# Patient Record
Sex: FEMALE
[De-identification: names, ages, dates, MRNs, and addresses within clinical notes are randomized per-mention and may not be internally consistent; named-entity substitution may affect disease eponyms.]

---

## 2019-07-31 ENCOUNTER — APPOINTMENT (OUTPATIENT)
Dept: MRI IMAGING | Facility: IMAGING CENTER | Age: 54
End: 2019-07-31
Payer: COMMERCIAL

## 2019-07-31 ENCOUNTER — OUTPATIENT (OUTPATIENT)
Dept: OUTPATIENT SERVICES | Facility: HOSPITAL | Age: 54
LOS: 1 days | End: 2019-07-31
Payer: COMMERCIAL

## 2019-07-31 DIAGNOSIS — Z00.8 ENCOUNTER FOR OTHER GENERAL EXAMINATION: ICD-10-CM

## 2019-07-31 PROCEDURE — C8937: CPT

## 2019-07-31 PROCEDURE — C8908: CPT

## 2019-07-31 PROCEDURE — 77049 MRI BREAST C-+ W/CAD BI: CPT | Mod: 26

## 2019-07-31 PROCEDURE — A9585: CPT

## 2019-08-13 ENCOUNTER — OUTPATIENT (OUTPATIENT)
Dept: OUTPATIENT SERVICES | Facility: HOSPITAL | Age: 54
LOS: 1 days | End: 2019-08-13
Payer: COMMERCIAL

## 2019-08-13 ENCOUNTER — APPOINTMENT (OUTPATIENT)
Dept: CT IMAGING | Facility: CLINIC | Age: 54
End: 2019-08-13
Payer: COMMERCIAL

## 2019-08-13 ENCOUNTER — APPOINTMENT (OUTPATIENT)
Dept: MRI IMAGING | Facility: CLINIC | Age: 54
End: 2019-08-13
Payer: COMMERCIAL

## 2019-08-13 ENCOUNTER — OUTPATIENT (OUTPATIENT)
Dept: OUTPATIENT SERVICES | Facility: HOSPITAL | Age: 54
LOS: 1 days | End: 2019-08-13

## 2019-08-13 DIAGNOSIS — Z00.8 ENCOUNTER FOR OTHER GENERAL EXAMINATION: ICD-10-CM

## 2019-08-13 PROCEDURE — 74183 MRI ABD W/O CNTR FLWD CNTR: CPT | Mod: 26

## 2019-08-13 PROCEDURE — A9585: CPT

## 2019-08-13 PROCEDURE — 74174 CTA ABD&PLVS W/CONTRAST: CPT

## 2019-08-13 PROCEDURE — 74174 CTA ABD&PLVS W/CONTRAST: CPT | Mod: 26

## 2019-08-13 PROCEDURE — 74183 MRI ABD W/O CNTR FLWD CNTR: CPT

## 2019-08-14 ENCOUNTER — OUTPATIENT (OUTPATIENT)
Dept: OUTPATIENT SERVICES | Facility: HOSPITAL | Age: 54
LOS: 1 days | End: 2019-08-14
Payer: COMMERCIAL

## 2019-08-14 VITALS
OXYGEN SATURATION: 98 % | WEIGHT: 166.89 LBS | DIASTOLIC BLOOD PRESSURE: 85 MMHG | TEMPERATURE: 98 F | HEIGHT: 64 IN | HEART RATE: 74 BPM | RESPIRATION RATE: 14 BRPM | SYSTOLIC BLOOD PRESSURE: 140 MMHG

## 2019-08-14 DIAGNOSIS — Z85.3 PERSONAL HISTORY OF MALIGNANT NEOPLASM OF BREAST: ICD-10-CM

## 2019-08-14 DIAGNOSIS — Z40.01 ENCOUNTER FOR PROPHYLACTIC REMOVAL OF BREAST: ICD-10-CM

## 2019-08-14 DIAGNOSIS — D05.00 LOBULAR CARCINOMA IN SITU OF UNSPECIFIED BREAST: ICD-10-CM

## 2019-08-14 DIAGNOSIS — Z90.13 ACQUIRED ABSENCE OF BILATERAL BREASTS AND NIPPLES: ICD-10-CM

## 2019-08-14 DIAGNOSIS — D05.12 INTRADUCTAL CARCINOMA IN SITU OF LEFT BREAST: ICD-10-CM

## 2019-08-14 DIAGNOSIS — Z29.9 ENCOUNTER FOR PROPHYLACTIC MEASURES, UNSPECIFIED: ICD-10-CM

## 2019-08-14 DIAGNOSIS — Z98.891 HISTORY OF UTERINE SCAR FROM PREVIOUS SURGERY: Chronic | ICD-10-CM

## 2019-08-14 DIAGNOSIS — N63.0 UNSPECIFIED LUMP IN UNSPECIFIED BREAST: ICD-10-CM

## 2019-08-14 DIAGNOSIS — R20.1 HYPOESTHESIA OF SKIN: ICD-10-CM

## 2019-08-14 LAB
BLD GP AB SCN SERPL QL: NEGATIVE — SIGNIFICANT CHANGE UP
RH IG SCN BLD-IMP: NEGATIVE — SIGNIFICANT CHANGE UP

## 2019-08-14 PROCEDURE — 86901 BLOOD TYPING SEROLOGIC RH(D): CPT

## 2019-08-14 PROCEDURE — 86900 BLOOD TYPING SEROLOGIC ABO: CPT

## 2019-08-14 PROCEDURE — G0463: CPT

## 2019-08-14 PROCEDURE — 86850 RBC ANTIBODY SCREEN: CPT

## 2019-08-14 RX ORDER — CEFAZOLIN SODIUM 1 G
2000 VIAL (EA) INJECTION ONCE
Refills: 0 | Status: DISCONTINUED | OUTPATIENT
Start: 2019-08-19 | End: 2019-08-21

## 2019-08-14 NOTE — H&P PST ADULT - ASSESSMENT
breat mass bilateral  CAPRINI VTE 2.0 SCORE [CLOT updated 2019]    AGE RELATED RISK FACTORS                                                       MOBILITY RELATED FACTORS  [x ] Age 41-60 years                                            (1 Point)                    [ ] Bed rest                                                        (1 Point)  [ ] Age: 61-74 years                                           (2 Points)                  [ ] Plaster cast                                                   (2 Points)  [ ] Age= 75 years                                              (3 Points)                    [ ] Bed bound for more than 72 hours                 (2 Points)    DISEASE RELATED RISK FACTORS                                               GENDER SPECIFIC FACTORS  [ ] Edema in the lower extremities                       (1 Point)              [ ] Pregnancy                                                     (1 Point)  [ ] Varicose veins                                               (1 Point)                     [ ] Post-partum < 6 weeks                                   (1 Point)             [x ] BMI > 25 Kg/m2                                            (1 Point)                     [ ] Hormonal therapy  or oral contraception          (1 Point)                 [ ] Sepsis (in the previous month)                        (1 Point)               [ ] History of pregnancy complications                 (1 point)  [ ] Pneumonia or serious lung disease                                               [ ] Unexplained or recurrent                     (1 Point)           (in the previous month)                               (1 Point)  [ ] Abnormal pulmonary function test                     (1 Point)                 SURGERY RELATED RISK FACTORS  [ ] Acute myocardial infarction                              (1 Point)               [ ]  Section                                             (1 Point)  [ ] Congestive heart failure (in the previous month)  (1 Point)      [ ] Minor surgery                                                  (1 Point)   [ ] Inflammatory bowel disease                             (1 Point)               [ ] Arthroscopic surgery                                        (2 Points)  [ ] Central venous access                                      (2 Points)                [ x] General surgery lasting more than 45 minutes (2 points)  [x ] Malignancy- Present or previous                   (2 Points)                [ ] Elective arthroplasty                                         (5 points)    [ ] Stroke (in the previous month)                          (5 Points)                                                                                                                                                           HEMATOLOGY RELATED FACTORS                                                 TRAUMA RELATED RISK FACTORS  [ ] Prior episodes of VTE                                     (3 Points)                [ ] Fracture of the hip, pelvis, or leg                       (5 Points)  [ ] Positive family history for VTE                         (3 Points)             [ ] Acute spinal cord injury (in the previous month)  (5 Points)  [ ] Prothrombin 12495 A                                     (3 Points)               [ ] Paralysis  (less than 1 month)                             (5 Points)  [ ] Factor V Leiden                                             (3 Points)                  [ ] Multiple Trauma within 1 month                        (5 Points)  [ ] Lupus anticoagulants                                     (3 Points)                                                           [ ] Anticardiolipin antibodies                               (3 Points)                                                       [ ] High homocysteine in the blood                      (3 Points)                                             [ ] Other congenital or acquired thrombophilia      (3 Points)                                                [ ] Heparin induced thrombocytopenia                  (3 Points)                                     Total Score [6 ]

## 2019-08-14 NOTE — H&P PST ADULT - HISTORY OF PRESENT ILLNESS
54 year old female reports having abnormal mammogram with breast lump in both breast, scheduled for bilateral simple mastectomies with reconstruction on 08/19/19.

## 2019-08-14 NOTE — H&P PST ADULT - NSICDXPROBLEM_GEN_ALL_CORE_FT
PROBLEM DIAGNOSES  Problem: Breast lump  Assessment and Plan: Bilateral mastectomies PC  bilateral sentinel LN biopsies  Possible axillary dissection  Bilateral ESA  Bilateral nerve grafting    Problem: Prophylactic measure  Assessment and Plan: The Caprini score indicates that this patient is at high risk for a VTE event (score 6 or greater). Surgical patients in this group will benefit from both pharmacologic prophylaxis and intermittent compression devices.  The surgical team will determine the balance between VTE risk and bleeding risk, and other clinical considerations

## 2019-08-15 ENCOUNTER — OUTPATIENT (OUTPATIENT)
Dept: OUTPATIENT SERVICES | Facility: HOSPITAL | Age: 54
LOS: 1 days | End: 2019-08-15
Payer: COMMERCIAL

## 2019-08-15 DIAGNOSIS — C50.919 MALIGNANT NEOPLASM OF UNSPECIFIED SITE OF UNSPECIFIED FEMALE BREAST: ICD-10-CM

## 2019-08-15 DIAGNOSIS — Z98.891 HISTORY OF UTERINE SCAR FROM PREVIOUS SURGERY: Chronic | ICD-10-CM

## 2019-08-16 PROBLEM — N63.0 UNSPECIFIED LUMP IN UNSPECIFIED BREAST: Chronic | Status: ACTIVE | Noted: 2019-08-14

## 2019-08-18 ENCOUNTER — TRANSCRIPTION ENCOUNTER (OUTPATIENT)
Age: 54
End: 2019-08-18

## 2019-08-19 ENCOUNTER — RESULT REVIEW (OUTPATIENT)
Age: 54
End: 2019-08-19

## 2019-08-19 ENCOUNTER — INPATIENT (INPATIENT)
Facility: HOSPITAL | Age: 54
LOS: 1 days | Discharge: HOME | DRG: 581 | End: 2019-08-21
Attending: PLASTIC SURGERY | Admitting: SPECIALIST
Payer: COMMERCIAL

## 2019-08-19 ENCOUNTER — APPOINTMENT (OUTPATIENT)
Dept: NUCLEAR MEDICINE | Facility: HOSPITAL | Age: 54
End: 2019-08-19

## 2019-08-19 VITALS
TEMPERATURE: 98 F | HEART RATE: 75 BPM | OXYGEN SATURATION: 99 % | SYSTOLIC BLOOD PRESSURE: 129 MMHG | RESPIRATION RATE: 18 BRPM | HEIGHT: 64 IN | WEIGHT: 166.89 LBS | DIASTOLIC BLOOD PRESSURE: 84 MMHG

## 2019-08-19 DIAGNOSIS — Z98.891 HISTORY OF UTERINE SCAR FROM PREVIOUS SURGERY: Chronic | ICD-10-CM

## 2019-08-19 DIAGNOSIS — D05.00 LOBULAR CARCINOMA IN SITU OF UNSPECIFIED BREAST: ICD-10-CM

## 2019-08-19 DIAGNOSIS — R20.1 HYPOESTHESIA OF SKIN: ICD-10-CM

## 2019-08-19 LAB
ANION GAP SERPL CALC-SCNC: 16 MMOL/L — SIGNIFICANT CHANGE UP (ref 5–17)
BUN SERPL-MCNC: 11 MG/DL — SIGNIFICANT CHANGE UP (ref 7–23)
CALCIUM SERPL-MCNC: 7.2 MG/DL — LOW (ref 8.4–10.5)
CHLORIDE SERPL-SCNC: 108 MMOL/L — SIGNIFICANT CHANGE UP (ref 96–108)
CO2 SERPL-SCNC: 17 MMOL/L — LOW (ref 22–31)
CREAT SERPL-MCNC: 0.74 MG/DL — SIGNIFICANT CHANGE UP (ref 0.5–1.3)
GLUCOSE SERPL-MCNC: 180 MG/DL — HIGH (ref 70–99)
HCT VFR BLD CALC: 33.3 % — LOW (ref 34.5–45)
HGB BLD-MCNC: 10.9 G/DL — LOW (ref 11.5–15.5)
MCHC RBC-ENTMCNC: 28.8 PG — SIGNIFICANT CHANGE UP (ref 27–34)
MCHC RBC-ENTMCNC: 32.7 GM/DL — SIGNIFICANT CHANGE UP (ref 32–36)
MCV RBC AUTO: 88 FL — SIGNIFICANT CHANGE UP (ref 80–100)
PLATELET # BLD AUTO: 317 K/UL — SIGNIFICANT CHANGE UP (ref 150–400)
POTASSIUM SERPL-MCNC: 4.2 MMOL/L — SIGNIFICANT CHANGE UP (ref 3.5–5.3)
POTASSIUM SERPL-SCNC: 4.2 MMOL/L — SIGNIFICANT CHANGE UP (ref 3.5–5.3)
RBC # BLD: 3.79 M/UL — LOW (ref 3.8–5.2)
RBC # FLD: 11.7 % — SIGNIFICANT CHANGE UP (ref 10.3–14.5)
RH IG SCN BLD-IMP: NEGATIVE — SIGNIFICANT CHANGE UP
SODIUM SERPL-SCNC: 141 MMOL/L — SIGNIFICANT CHANGE UP (ref 135–145)
WBC # BLD: 20.6 K/UL — HIGH (ref 3.8–10.5)
WBC # FLD AUTO: 20.6 K/UL — HIGH (ref 3.8–10.5)

## 2019-08-19 PROCEDURE — 88321 CONSLTJ&REPRT SLD PREP ELSWR: CPT

## 2019-08-19 PROCEDURE — 88360 TUMOR IMMUNOHISTOCHEM/MANUAL: CPT | Mod: 26,59

## 2019-08-19 PROCEDURE — 88342 IMHCHEM/IMCYTCHM 1ST ANTB: CPT | Mod: 26,59

## 2019-08-19 PROCEDURE — 88307 TISSUE EXAM BY PATHOLOGIST: CPT | Mod: 26,59

## 2019-08-19 PROCEDURE — 88341 IMHCHEM/IMCYTCHM EA ADD ANTB: CPT | Mod: 26,59

## 2019-08-19 PROCEDURE — 88302 TISSUE EXAM BY PATHOLOGIST: CPT | Mod: 26,59

## 2019-08-19 PROCEDURE — 88331 PATH CONSLTJ SURG 1 BLK 1SPC: CPT | Mod: 26

## 2019-08-19 PROCEDURE — 88305 TISSUE EXAM BY PATHOLOGIST: CPT | Mod: 26,59

## 2019-08-19 PROCEDURE — 88377 M/PHMTRC ALYS ISHQUANT/SEMIQ: CPT | Mod: 26,59

## 2019-08-19 RX ORDER — KETOROLAC TROMETHAMINE 30 MG/ML
15 SYRINGE (ML) INJECTION EVERY 6 HOURS
Refills: 0 | Status: DISCONTINUED | OUTPATIENT
Start: 2019-08-19 | End: 2019-08-20

## 2019-08-19 RX ORDER — CALCIUM CARBONATE 500(1250)
1 TABLET ORAL THREE TIMES A DAY
Refills: 0 | Status: DISCONTINUED | OUTPATIENT
Start: 2019-08-19 | End: 2019-08-21

## 2019-08-19 RX ORDER — OXYCODONE HYDROCHLORIDE 5 MG/1
5 TABLET ORAL EVERY 4 HOURS
Refills: 0 | Status: DISCONTINUED | OUTPATIENT
Start: 2019-08-19 | End: 2019-08-21

## 2019-08-19 RX ORDER — CEFAZOLIN SODIUM 1 G
2000 VIAL (EA) INJECTION EVERY 8 HOURS
Refills: 0 | Status: COMPLETED | OUTPATIENT
Start: 2019-08-19 | End: 2019-08-20

## 2019-08-19 RX ORDER — BENZOCAINE AND MENTHOL 5; 1 G/100ML; G/100ML
1 LIQUID ORAL THREE TIMES A DAY
Refills: 0 | Status: DISCONTINUED | OUTPATIENT
Start: 2019-08-19 | End: 2019-08-21

## 2019-08-19 RX ORDER — FENTANYL CITRATE 50 UG/ML
25 INJECTION INTRAVENOUS
Refills: 0 | Status: DISCONTINUED | OUTPATIENT
Start: 2019-08-19 | End: 2019-08-20

## 2019-08-19 RX ORDER — OXYCODONE HYDROCHLORIDE 5 MG/1
10 TABLET ORAL EVERY 4 HOURS
Refills: 0 | Status: DISCONTINUED | OUTPATIENT
Start: 2019-08-19 | End: 2019-08-21

## 2019-08-19 RX ORDER — ONDANSETRON 8 MG/1
4 TABLET, FILM COATED ORAL ONCE
Refills: 0 | Status: COMPLETED | OUTPATIENT
Start: 2019-08-19 | End: 2019-08-19

## 2019-08-19 RX ORDER — HYDROMORPHONE HYDROCHLORIDE 2 MG/ML
0.5 INJECTION INTRAMUSCULAR; INTRAVENOUS; SUBCUTANEOUS EVERY 4 HOURS
Refills: 0 | Status: DISCONTINUED | OUTPATIENT
Start: 2019-08-19 | End: 2019-08-21

## 2019-08-19 RX ORDER — ACETAMINOPHEN 500 MG
650 TABLET ORAL EVERY 6 HOURS
Refills: 0 | Status: DISCONTINUED | OUTPATIENT
Start: 2019-08-20 | End: 2019-08-21

## 2019-08-19 RX ORDER — SODIUM CHLORIDE 9 MG/ML
3 INJECTION INTRAMUSCULAR; INTRAVENOUS; SUBCUTANEOUS EVERY 8 HOURS
Refills: 0 | Status: DISCONTINUED | OUTPATIENT
Start: 2019-08-19 | End: 2019-08-19

## 2019-08-19 RX ORDER — CHLORHEXIDINE GLUCONATE 213 G/1000ML
1 SOLUTION TOPICAL ONCE
Refills: 0 | Status: DISCONTINUED | OUTPATIENT
Start: 2019-08-19 | End: 2019-08-19

## 2019-08-19 RX ORDER — LIDOCAINE HCL 20 MG/ML
0.2 VIAL (ML) INJECTION ONCE
Refills: 0 | Status: DISCONTINUED | OUTPATIENT
Start: 2019-08-19 | End: 2019-08-19

## 2019-08-19 RX ORDER — DIPHENHYDRAMINE HCL 50 MG
25 CAPSULE ORAL EVERY 4 HOURS
Refills: 0 | Status: DISCONTINUED | OUTPATIENT
Start: 2019-08-19 | End: 2019-08-21

## 2019-08-19 RX ORDER — FENTANYL CITRATE 50 UG/ML
50 INJECTION INTRAVENOUS
Refills: 0 | Status: DISCONTINUED | OUTPATIENT
Start: 2019-08-19 | End: 2019-08-19

## 2019-08-19 RX ORDER — ACETAMINOPHEN 500 MG
1000 TABLET ORAL ONCE
Refills: 0 | Status: COMPLETED | OUTPATIENT
Start: 2019-08-19 | End: 2019-08-19

## 2019-08-19 RX ORDER — ACETAMINOPHEN 500 MG
1000 TABLET ORAL ONCE
Refills: 0 | Status: COMPLETED | OUTPATIENT
Start: 2019-08-20 | End: 2019-08-20

## 2019-08-19 RX ORDER — HEPARIN SODIUM 5000 [USP'U]/ML
5000 INJECTION INTRAVENOUS; SUBCUTANEOUS EVERY 12 HOURS
Refills: 0 | Status: DISCONTINUED | OUTPATIENT
Start: 2019-08-19 | End: 2019-08-21

## 2019-08-19 RX ORDER — METOCLOPRAMIDE HCL 10 MG
10 TABLET ORAL ONCE
Refills: 0 | Status: COMPLETED | OUTPATIENT
Start: 2019-08-19 | End: 2019-08-19

## 2019-08-19 RX ORDER — IBUPROFEN 200 MG
400 TABLET ORAL EVERY 8 HOURS
Refills: 0 | Status: DISCONTINUED | OUTPATIENT
Start: 2019-08-20 | End: 2019-08-21

## 2019-08-19 RX ORDER — SODIUM CHLORIDE 9 MG/ML
1000 INJECTION, SOLUTION INTRAVENOUS
Refills: 0 | Status: DISCONTINUED | OUTPATIENT
Start: 2019-08-19 | End: 2019-08-20

## 2019-08-19 RX ADMIN — Medication 100 MILLIGRAM(S): at 20:00

## 2019-08-19 RX ADMIN — Medication 400 MILLIGRAM(S): at 23:00

## 2019-08-19 RX ADMIN — Medication 15 MILLIGRAM(S): at 22:02

## 2019-08-19 RX ADMIN — FENTANYL CITRATE 50 MICROGRAM(S): 50 INJECTION INTRAVENOUS at 19:45

## 2019-08-19 RX ADMIN — FENTANYL CITRATE 50 MICROGRAM(S): 50 INJECTION INTRAVENOUS at 19:33

## 2019-08-19 RX ADMIN — Medication 15 MILLIGRAM(S): at 22:15

## 2019-08-19 RX ADMIN — SODIUM CHLORIDE 125 MILLILITER(S): 9 INJECTION, SOLUTION INTRAVENOUS at 19:05

## 2019-08-19 RX ADMIN — Medication 1000 MILLIGRAM(S): at 23:30

## 2019-08-19 RX ADMIN — ONDANSETRON 4 MILLIGRAM(S): 8 TABLET, FILM COATED ORAL at 19:33

## 2019-08-19 RX ADMIN — Medication 10 MILLIGRAM(S): at 20:46

## 2019-08-19 NOTE — PRE-ANESTHESIA EVALUATION ADULT - NSANTHAIRWAYFT_ENT_ALL_CORE
Mouth opening: >2cm incisor distance  Thyromental distance: >3 FBs  Upper lip bite: adequate  Cervical ROM: grossly intact

## 2019-08-19 NOTE — BRIEF OPERATIVE NOTE - OPERATION/FINDINGS
Bilateral simple mastectomy with axillary sentinel node biopsy
b/l breast reconstruction with b/l ESA flaps (ipsilateral)

## 2019-08-19 NOTE — BRIEF OPERATIVE NOTE - NSICDXBRIEFPOSTOP_GEN_ALL_CORE_FT
POST-OP DIAGNOSIS:  Breast cancer 19-Aug-2019 12:14:28  Gustavo Arias
POST-OP DIAGNOSIS:  Breast cancer 19-Aug-2019 12:14:28  Gustavo Arias

## 2019-08-19 NOTE — BRIEF OPERATIVE NOTE - NSICDXBRIEFPROCEDURE_GEN_ALL_CORE_FT
PROCEDURES:  Bilateral simple mastectomy with sentinel node biopsy 19-Aug-2019 12:14:07  Gustavo Arias
PROCEDURES:  ESA flap, free 20-Aug-2019 06:00:47  Lazaro Lindsay

## 2019-08-19 NOTE — BRIEF OPERATIVE NOTE - NSICDXBRIEFPREOP_GEN_ALL_CORE_FT
PRE-OP DIAGNOSIS:  Breast cancer 19-Aug-2019 12:14:19  Gustavo Arias
PRE-OP DIAGNOSIS:  Breast cancer 19-Aug-2019 12:14:19  Gustavo Arias

## 2019-08-19 NOTE — PRE-ANESTHESIA EVALUATION ADULT - NSANTHPMHFT_GEN_ALL_CORE
denies further PMHx denies further PMHx  denies cardiac, respiratory, neurological, renal, hepatic, hematalogic disease

## 2019-08-20 LAB
ANION GAP SERPL CALC-SCNC: 9 MMOL/L — SIGNIFICANT CHANGE UP (ref 5–17)
BUN SERPL-MCNC: 9 MG/DL — SIGNIFICANT CHANGE UP (ref 7–23)
CALCIUM SERPL-MCNC: 7.3 MG/DL — LOW (ref 8.4–10.5)
CHLORIDE SERPL-SCNC: 109 MMOL/L — HIGH (ref 96–108)
CO2 SERPL-SCNC: 23 MMOL/L — SIGNIFICANT CHANGE UP (ref 22–31)
CREAT SERPL-MCNC: 0.64 MG/DL — SIGNIFICANT CHANGE UP (ref 0.5–1.3)
GLUCOSE SERPL-MCNC: 118 MG/DL — HIGH (ref 70–99)
HCT VFR BLD CALC: 28 % — LOW (ref 34.5–45)
HGB BLD-MCNC: 9 G/DL — LOW (ref 11.5–15.5)
MCHC RBC-ENTMCNC: 28.3 PG — SIGNIFICANT CHANGE UP (ref 27–34)
MCHC RBC-ENTMCNC: 32.2 GM/DL — SIGNIFICANT CHANGE UP (ref 32–36)
MCV RBC AUTO: 88 FL — SIGNIFICANT CHANGE UP (ref 80–100)
PLATELET # BLD AUTO: 243 K/UL — SIGNIFICANT CHANGE UP (ref 150–400)
POTASSIUM SERPL-MCNC: 4.1 MMOL/L — SIGNIFICANT CHANGE UP (ref 3.5–5.3)
POTASSIUM SERPL-SCNC: 4.1 MMOL/L — SIGNIFICANT CHANGE UP (ref 3.5–5.3)
RBC # BLD: 3.18 M/UL — LOW (ref 3.8–5.2)
RBC # FLD: 11.9 % — SIGNIFICANT CHANGE UP (ref 10.3–14.5)
SODIUM SERPL-SCNC: 141 MMOL/L — SIGNIFICANT CHANGE UP (ref 135–145)
WBC # BLD: 12.7 K/UL — HIGH (ref 3.8–10.5)
WBC # FLD AUTO: 12.7 K/UL — HIGH (ref 3.8–10.5)

## 2019-08-20 RX ADMIN — OXYCODONE HYDROCHLORIDE 5 MILLIGRAM(S): 5 TABLET ORAL at 21:20

## 2019-08-20 RX ADMIN — OXYCODONE HYDROCHLORIDE 5 MILLIGRAM(S): 5 TABLET ORAL at 15:00

## 2019-08-20 RX ADMIN — Medication 400 MILLIGRAM(S): at 09:49

## 2019-08-20 RX ADMIN — Medication 650 MILLIGRAM(S): at 18:18

## 2019-08-20 RX ADMIN — Medication 1000 MILLIGRAM(S): at 05:30

## 2019-08-20 RX ADMIN — Medication 400 MILLIGRAM(S): at 18:18

## 2019-08-20 RX ADMIN — Medication 650 MILLIGRAM(S): at 23:34

## 2019-08-20 RX ADMIN — Medication 400 MILLIGRAM(S): at 10:19

## 2019-08-20 RX ADMIN — HEPARIN SODIUM 5000 UNIT(S): 5000 INJECTION INTRAVENOUS; SUBCUTANEOUS at 02:12

## 2019-08-20 RX ADMIN — HEPARIN SODIUM 5000 UNIT(S): 5000 INJECTION INTRAVENOUS; SUBCUTANEOUS at 15:00

## 2019-08-20 RX ADMIN — Medication 15 MILLIGRAM(S): at 04:05

## 2019-08-20 RX ADMIN — OXYCODONE HYDROCHLORIDE 5 MILLIGRAM(S): 5 TABLET ORAL at 20:50

## 2019-08-20 RX ADMIN — SODIUM CHLORIDE 75 MILLILITER(S): 9 INJECTION, SOLUTION INTRAVENOUS at 09:50

## 2019-08-20 RX ADMIN — Medication 15 MILLIGRAM(S): at 04:20

## 2019-08-20 RX ADMIN — OXYCODONE HYDROCHLORIDE 5 MILLIGRAM(S): 5 TABLET ORAL at 15:30

## 2019-08-20 RX ADMIN — Medication 100 MILLIGRAM(S): at 04:06

## 2019-08-20 RX ADMIN — Medication 650 MILLIGRAM(S): at 11:33

## 2019-08-20 RX ADMIN — Medication 400 MILLIGRAM(S): at 05:04

## 2019-08-20 NOTE — PROGRESS NOTE ADULT - ASSESSMENT
54yF POD1  bilateral mastectomy and ESA flap reconstruction. Recovering appropriately in PACU.     Plan:  - Pain control  - OOB  - Continue care per Plastics     Green team surgery,  [id number]

## 2019-08-20 NOTE — PROGRESS NOTE ADULT - SUBJECTIVE AND OBJECTIVE BOX
ANESTHESIA POSTOP CHECK    54y Female POSTOP DAY 1     Vital Signs Last 24 Hrs  T(C): 36.2 (20 Aug 2019 08:45), Max: 36.7 (20 Aug 2019 00:00)  T(F): 97.2 (20 Aug 2019 08:45), Max: 98.1 (20 Aug 2019 00:00)  HR: 87 (20 Aug 2019 08:45) (69 - 98)  BP: 98/54 (20 Aug 2019 08:45) (87/49 - 102/58)  BP(mean): 72 (20 Aug 2019 08:45) (62 - 76)  RR: 18 (20 Aug 2019 08:45) (16 - 18)  SpO2: 98% (20 Aug 2019 08:45) (96% - 100%)  I&O's Summary    19 Aug 2019 07:01  -  20 Aug 2019 07:00  --------------------------------------------------------  IN: 2025 mL / OUT: 1768 mL / NET: 257 mL    20 Aug 2019 07:01  -  20 Aug 2019 08:53  --------------------------------------------------------  IN: 275 mL / OUT: 0 mL / NET: 275 mL        [x] NO APPARENT ANESTHESIA COMPLICATIONS

## 2019-08-20 NOTE — PROGRESS NOTE ADULT - SUBJECTIVE AND OBJECTIVE BOX
SUBJECTIVE: Pt seen and examined at bedside with chief resident. POD1 s/p bilateral mastectomy with ESA flap. No events overnight. Patient reports that pain is controlled. Vioptix showing signals in 80-90s overnight. Capillary refill and doppler checks insignificant. Blood pressure mildly low overnight, consistent with post-anesthesia effects.     Damon: 100-150cc/hr   ROMAIN: 30cc, 25cc, 40cc, 18cc, 25cc, 40cc    MEDICATIONS  (STANDING):  acetaminophen   Tablet .. 650 milliGRAM(s) Oral every 6 hours  ceFAZolin   IVPB 2000 milliGRAM(s) IV Intermittent once  heparin  Injectable 5000 Unit(s) SubCutaneous every 12 hours  ibuprofen  Tablet. 400 milliGRAM(s) Oral every 8 hours  lactated ringers. 1000 milliLiter(s) (75 mL/Hr) IV Continuous <Continuous>    MEDICATIONS  (PRN):  benzocaine 15 mG/menthol 3.6 mG Lozenge 1 Lozenge Oral three times a day PRN Sore Throat  calcium carbonate    500 mG (Tums) Chewable 1 Tablet(s) Chew three times a day PRN Dyspepsia  diphenhydrAMINE 25 milliGRAM(s) Oral every 4 hours PRN Rash and/or Itching  fentaNYL    Injectable 25 MICROGram(s) IV Push every 5 minutes PRN Moderate Pain (4 - 6)  HYDROmorphone  Injectable 0.5 milliGRAM(s) IV Push every 4 hours PRN breakthrough pain  oxyCODONE    IR 5 milliGRAM(s) Oral every 4 hours PRN Moderate Pain (4 - 6)  oxyCODONE    IR 10 milliGRAM(s) Oral every 4 hours PRN Severe Pain (7 - 10)      OBJECTIVE:    Vital Signs Last 24 Hrs  T(C): 36.5 (20 Aug 2019 02:00), Max: 36.7 (20 Aug 2019 00:00)  T(F): 97.7 (20 Aug 2019 02:00), Max: 98.1 (20 Aug 2019 00:00)  HR: 75 (20 Aug 2019 04:00) (69 - 98)  BP: 90/53 (20 Aug 2019 04:00) (87/52 - 129/84)  BP(mean): 63 (20 Aug 2019 04:00) (63 - 76)  RR: 16 (20 Aug 2019 04:00) (16 - 18)  SpO2: 100% (20 Aug 2019 04:00) (96% - 100%)    General Appearance: Resting comfortably, no acute distress  Chest: non-labored breathing, no respiratory distress, skin flaps over breasts with good color and capillary refill, vioptix in place showing 88 and 99%, incisions clean dry and intact, drains bilaterally SS  CV: Pulse regular presently  Abdomen: Soft, non-tender, non-distended, incision clean dry and intact, drains SS  Extremities: warm and well perfused    I&O's Summary    19 Aug 2019 07:01  -  20 Aug 2019 06:38  --------------------------------------------------------  IN: 1900 mL / OUT: 1633 mL / NET: 267 mL      I&O's Detail    19 Aug 2019 07:01  -  20 Aug 2019 06:38  --------------------------------------------------------  IN:    IV PiggyBack: 300 mL    lactated ringers.: 1500 mL    Oral Fluid: 100 mL  Total IN: 1900 mL    OUT:    Bulb: 25 mL    Bulb: 18 mL    Bulb: 30 mL    Bulb: 25 mL    Bulb: 40 mL    Bulb: 40 mL    Indwelling Catheter - Urethral: 1455 mL  Total OUT: 1633 mL    Total NET: 267 mL            LABS:                        9.0    12.7  )-----------( 243      ( 20 Aug 2019 02:42 )             28.0     08-20    141  |  109<H>  |  9   ----------------------------<  118<H>  4.1   |  23  |  0.64    Ca    7.3<L>      20 Aug 2019 02:42            RADIOLOGY & ADDITIONAL STUDIES:

## 2019-08-20 NOTE — PROGRESS NOTE ADULT - ASSESSMENT
54 year old female POD1 s/p bilateral mastectomy and ESA flap reconstruction. Recovering well, no events overnight.     Plan:  - transfer to floor  - Q2 skin flap checks  - continue vioptix  - regular diet  - out of bed to chair with assistance  - d/c abdi catheter     Plastic Surgery  [phone]

## 2019-08-20 NOTE — PROGRESS NOTE ADULT - SUBJECTIVE AND OBJECTIVE BOX
NAD  Afebrile, VSS  ViOptix   Bilateral breast free flap viable, closures intact.  Abdomen flat, closure intact.  Drains serosang.  Hb - 9.9    Stable POD 1  T(C): 36.5 (08-20-19 @ 02:00), Max: 36.7 (08-20-19 @ 00:00)  HR: 75 (08-20-19 @ 04:00) (69 - 98)  BP: 90/53 (08-20-19 @ 04:00) (87/52 - 129/84)  RR: 16 (08-20-19 @ 04:00) (16 - 18)  SpO2: 100% (08-20-19 @ 04:00) (96% - 100%)  Wt(kg): --    08-19 @ 07:01  -  08-20 @ 06:21  --------------------------------------------------------  IN: 1675 mL / OUT: 1513 mL / NET: 162 mL      Transfer to floor  Advance diet  OOB to chair  Continue ViOptix

## 2019-08-20 NOTE — PROGRESS NOTE ADULT - SUBJECTIVE AND OBJECTIVE BOX
Interval Events: No acute interval events    S: Patient doing well, denies fevers, chills, nausea, emesis, chest pain, SOB.    O: Vital Signs  T(C): 36.7 (08-20 @ 12:18), Max: 37.3 (08-20 @ 11:20)  HR: 87 (08-20 @ 12:18) (69 - 98)  BP: 101/55 (08-20 @ 12:18) (87/49 - 104/65)  RR: 18 (08-20 @ 12:18) (16 - 18)  SpO2: 96% (08-20 @ 12:18) (96% - 100%)  08-19-19 @ 07:01  -  08-20-19 @ 07:00  --------------------------------------------------------  IN: 2025 mL / OUT: 1768 mL / NET: 257 mL    08-20-19 @ 07:01  -  08-20-19 @ 16:35  --------------------------------------------------------  IN: 1740 mL / OUT: 1480 mL / NET: 260 mL      General: alert and oriented, NAD  Resp: airway patent, respirations unlabored  CVS: regular rate and rhythm  Chest: skin flaps over breasts with good color and capillary refill, vioptix in place showing 88 and 99%, incisions clean dry and intact, ROMAIN drains intact bilaterally and draining serosanguinous fluid  Abdomen: soft, nontender, nondistended, ROMAIN drains functional and draining Serosanguinous   Extremities: no edema  Skin: warm, dry, appropriate color                          9.0    12.7  )-----------( 243      ( 20 Aug 2019 02:42 )             28.0   08-20    141  |  109<H>  |  9   ----------------------------<  118<H>  4.1   |  23  |  0.64    Ca    7.3<L>      20 Aug 2019 02:42

## 2019-08-21 ENCOUNTER — TRANSCRIPTION ENCOUNTER (OUTPATIENT)
Age: 54
End: 2019-08-21

## 2019-08-21 VITALS
OXYGEN SATURATION: 94 % | RESPIRATION RATE: 18 BRPM | HEART RATE: 93 BPM | TEMPERATURE: 98 F | SYSTOLIC BLOOD PRESSURE: 122 MMHG | WEIGHT: 171.52 LBS | DIASTOLIC BLOOD PRESSURE: 69 MMHG

## 2019-08-21 PROCEDURE — 80048 BASIC METABOLIC PNL TOTAL CA: CPT

## 2019-08-21 PROCEDURE — 88342 IMHCHEM/IMCYTCHM 1ST ANTB: CPT

## 2019-08-21 PROCEDURE — 86901 BLOOD TYPING SEROLOGIC RH(D): CPT

## 2019-08-21 PROCEDURE — 88360 TUMOR IMMUNOHISTOCHEM/MANUAL: CPT

## 2019-08-21 PROCEDURE — A9541: CPT

## 2019-08-21 PROCEDURE — C1889: CPT

## 2019-08-21 PROCEDURE — 88331 PATH CONSLTJ SURG 1 BLK 1SPC: CPT

## 2019-08-21 PROCEDURE — 88305 TISSUE EXAM BY PATHOLOGIST: CPT

## 2019-08-21 PROCEDURE — 86900 BLOOD TYPING SEROLOGIC ABO: CPT

## 2019-08-21 PROCEDURE — 85027 COMPLETE CBC AUTOMATED: CPT

## 2019-08-21 PROCEDURE — 88307 TISSUE EXAM BY PATHOLOGIST: CPT

## 2019-08-21 PROCEDURE — 88377 M/PHMTRC ALYS ISHQUANT/SEMIQ: CPT

## 2019-08-21 PROCEDURE — 88302 TISSUE EXAM BY PATHOLOGIST: CPT

## 2019-08-21 PROCEDURE — 88341 IMHCHEM/IMCYTCHM EA ADD ANTB: CPT

## 2019-08-21 RX ORDER — ONDANSETRON 8 MG/1
4 TABLET, FILM COATED ORAL ONCE
Refills: 0 | Status: COMPLETED | OUTPATIENT
Start: 2019-08-21 | End: 2019-08-21

## 2019-08-21 RX ORDER — IBUPROFEN 200 MG
1 TABLET ORAL
Qty: 0 | Refills: 0 | DISCHARGE
Start: 2019-08-21

## 2019-08-21 RX ORDER — ACETAMINOPHEN 500 MG
2 TABLET ORAL
Qty: 0 | Refills: 0 | DISCHARGE
Start: 2019-08-21

## 2019-08-21 RX ORDER — ONDANSETRON 8 MG/1
1 TABLET, FILM COATED ORAL
Qty: 4 | Refills: 0
Start: 2019-08-21

## 2019-08-21 RX ORDER — OXYCODONE HYDROCHLORIDE 5 MG/1
1 TABLET ORAL
Qty: 0 | Refills: 0 | DISCHARGE
Start: 2019-08-21

## 2019-08-21 RX ADMIN — HEPARIN SODIUM 5000 UNIT(S): 5000 INJECTION INTRAVENOUS; SUBCUTANEOUS at 13:33

## 2019-08-21 RX ADMIN — HEPARIN SODIUM 5000 UNIT(S): 5000 INJECTION INTRAVENOUS; SUBCUTANEOUS at 02:00

## 2019-08-21 RX ADMIN — Medication 650 MILLIGRAM(S): at 14:08

## 2019-08-21 RX ADMIN — Medication 400 MILLIGRAM(S): at 09:35

## 2019-08-21 RX ADMIN — Medication 400 MILLIGRAM(S): at 10:00

## 2019-08-21 RX ADMIN — Medication 400 MILLIGRAM(S): at 02:00

## 2019-08-21 RX ADMIN — OXYCODONE HYDROCHLORIDE 5 MILLIGRAM(S): 5 TABLET ORAL at 10:15

## 2019-08-21 RX ADMIN — ONDANSETRON 4 MILLIGRAM(S): 8 TABLET, FILM COATED ORAL at 11:24

## 2019-08-21 RX ADMIN — Medication 650 MILLIGRAM(S): at 00:04

## 2019-08-21 RX ADMIN — Medication 650 MILLIGRAM(S): at 13:32

## 2019-08-21 RX ADMIN — OXYCODONE HYDROCHLORIDE 5 MILLIGRAM(S): 5 TABLET ORAL at 09:35

## 2019-08-21 RX ADMIN — Medication 650 MILLIGRAM(S): at 04:19

## 2019-08-21 RX ADMIN — Medication 400 MILLIGRAM(S): at 02:30

## 2019-08-21 NOTE — DISCHARGE NOTE NURSING/CASE MANAGEMENT/SOCIAL WORK - NSDCDPATPORTLINK_GEN_ALL_CORE
You can access the [employer] Patient Portal, offered by [facility], by registering with the following website: [email or web address]

## 2019-08-21 NOTE — PROVIDER CONTACT NOTE (OTHER) - BACKGROUND
s/p bilateral breast reconstruction with ESA flap on 8/19, on vioptix monitoring for B/L breast tissues oxygenation.

## 2019-08-21 NOTE — PROGRESS NOTE ADULT - ASSESSMENT
54 year old female POD2 s/p bilateral mastectomy and ESA flap reconstruction. Recovering well, skin flaps with good signal, color and warmth.     Plan:  - continue out of bed and regular diet  - drain teaching  - d/c home today or tomorrow   - discuss with Dr Abdullahi      Plastic Surgery  [phone]

## 2019-08-21 NOTE — PROGRESS NOTE ADULT - SUBJECTIVE AND OBJECTIVE BOX
NAD  Afebrile, VSS  ViOptix on right lost contact, replaced and reading 55-56 consistently in two different locations, left 73  strong arterial and venous doppler on both flaps bilaterally  Bilateral breast free flap viable, closures intact.  Abdomen flat, closure intact.  Drains serosang.    ambulate  dc vioptix   likely dc later today

## 2019-08-21 NOTE — DISCHARGE NOTE PROVIDER - NSDCFUADDINST_GEN_ALL_CORE_FT
WOUND CARE: Dressings have a tape over them that will fall off on it's own. It is okay to shower, but do not soak in water.     DRAIN CARE: Empty drains once per day and record the amount and color in each one.    ACTIVITY: Do not lift arms above shoulders. Walk with body flexed, or bent over until it is comfortable to stand up straight.     DIET: No change in diet    PAIN: Take over the counter pain medications and take prescription pain meds for breakthrough pain. Take these medications as prescribed.     MEDS: You can resume any home medications that you were taking. Take antibiotics as prescribed until follow up with Dr. Abdullahi.

## 2019-08-21 NOTE — DISCHARGE NOTE PROVIDER - NSDCCPTREATMENT_GEN_ALL_CORE_FT
PRINCIPAL PROCEDURE  Procedure: Breast reconstruction with ESA free flap  Findings and Treatment:       SECONDARY PROCEDURE  Procedure: Total mastectomy  Findings and Treatment:

## 2019-08-21 NOTE — DISCHARGE NOTE PROVIDER - HOSPITAL COURSE
54 year old female with history of breast cancer presented for bilateral mastectomy and iliana flap reconstruction. She tolerated the surgery well with no complications. She was monitored in the PACU overnight post-operatively for checking viability of her flaps with Vioptix and regular checks by nursing and physician teams. She was able to ambulate, urinate and tolerate a regular diet by POD2 and her pain was controlled with oral pain medications. She was discharged home to follow up with Dr. Morse and Dr. Abdullahi. 54 year old female with history of breast cancer presented for bilateral mastectomy and iliana flap reconstruction. She tolerated the surgery well with no complications. She was monitored in the PACU overnight post-operatively for checking viability of her flaps with Vioptix and regular checks by nursing and physician teams. She was able to ambulate, urinate and tolerate a regular diet by POD2 and her pain was controlled with oral pain medications. She had some mild nausea controlled with nausea medications. She was able to tolerate her diet with no vomiting. She was discharged home to follow up with Dr. Morse and Dr. Abdullahi.

## 2019-08-21 NOTE — DISCHARGE NOTE PROVIDER - CARE PROVIDER_API CALL
Bill Abdullahi)  Plastic Surgery  [address], [address]  [place] [address]  Phone: [phone]  Fax: [phone]  Follow Up Time:     Sherrie Morse)  Surgery  [address], [address]  [place] [address]  Phone: [phone]  Fax: [phone]  Follow Up Time:

## 2019-08-21 NOTE — PROGRESS NOTE ADULT - SUBJECTIVE AND OBJECTIVE BOX
SUBJECTIVE: Pt seen and examined at bedside with chief resident. POD2 s/p bilateral mastectomy with ESA flap. Overnight, vioptix on left stopped working, but skin flap remained warm, not discolored and with doppler signals. Right side skin flap on vioptix with 76%. Patient has minimal pain and no complaints.       MEDICATIONS  (STANDING):  acetaminophen   Tablet .. 650 milliGRAM(s) Oral every 6 hours  ceFAZolin   IVPB 2000 milliGRAM(s) IV Intermittent once  heparin  Injectable 5000 Unit(s) SubCutaneous every 12 hours  ibuprofen  Tablet. 400 milliGRAM(s) Oral every 8 hours    MEDICATIONS  (PRN):  benzocaine 15 mG/menthol 3.6 mG Lozenge 1 Lozenge Oral three times a day PRN Sore Throat  calcium carbonate    500 mG (Tums) Chewable 1 Tablet(s) Chew three times a day PRN Dyspepsia  diphenhydrAMINE 25 milliGRAM(s) Oral every 4 hours PRN Rash and/or Itching  HYDROmorphone  Injectable 0.5 milliGRAM(s) IV Push every 4 hours PRN breakthrough pain  oxyCODONE    IR 5 milliGRAM(s) Oral every 4 hours PRN Moderate Pain (4 - 6)  oxyCODONE    IR 10 milliGRAM(s) Oral every 4 hours PRN Severe Pain (7 - 10)      OBJECTIVE:    Vital Signs Last 24 Hrs  T(C): 36.5 (21 Aug 2019 05:56), Max: 37.3 (20 Aug 2019 11:20)  T(F): 97.7 (21 Aug 2019 05:56), Max: 99.1 (20 Aug 2019 11:20)  HR: 92 (21 Aug 2019 05:56) (78 - 96)  BP: 100/68 (21 Aug 2019 05:56) (95/56 - 108/67)  BP(mean): 72 (20 Aug 2019 08:45) (72 - 72)  RR: 16 (21 Aug 2019 05:56) (16 - 18)  SpO2: 96% (21 Aug 2019 05:56) (96% - 100%)      General Appearance: Resting comfortably, no acute distress  Chest: non-labored breathing, no respiratory distress, skin flaps over breasts with good color and capillary refill, right breast had doppler signals, left with vioptix at 76%, incisions clean dry and intact, drains bilaterally SS  CV: Pulse regular presently  Abdomen: Soft, non-tender, non-distended, incision clean dry and intact, drains SS  Extremities: warm and well perfused        08-20 @ 07:01  -  08-21 @ 07:00  --------------------------------------------------------  IN: 2480 mL / OUT: 3550 mL / NET: -1070 mL            LABS:                         9.0    12.7  )-----------( 243      ( 20 Aug 2019 02:42 )             28.0     08-20    141  |  109<H>  |  9   ----------------------------<  118<H>  4.1   |  23  |  0.64    Ca    7.3<L>      20 Aug 2019 02:42

## 2019-08-21 NOTE — PROVIDER CONTACT NOTE (OTHER) - ASSESSMENT
Pt is A&Ox4, right breast flap pale, incision site w/ serousanguinous drainage, greater than 3 seconds cap refill and vioptics reading no longer present. Pt is A&Ox4, right breast flap pale, incision site w/ serousanguinous drainage, greater than 3 seconds cap refill and vioptics reading no longer present. Per primary RN Kirk pt had decrease in Vioptix signaling of the right breast earlier in the morning of which he informed MD Shen

## 2019-08-22 LAB — SURGICAL PATHOLOGY STUDY: SIGNIFICANT CHANGE UP

## 2019-09-09 ENCOUNTER — OUTPATIENT (OUTPATIENT)
Dept: OUTPATIENT SERVICES | Facility: HOSPITAL | Age: 54
LOS: 1 days | Discharge: HOME | End: 2019-09-09

## 2019-09-09 DIAGNOSIS — Z98.891 HISTORY OF UTERINE SCAR FROM PREVIOUS SURGERY: Chronic | ICD-10-CM

## 2019-09-09 DIAGNOSIS — C50.919 MALIGNANT NEOPLASM OF UNSPECIFIED SITE OF UNSPECIFIED FEMALE BREAST: ICD-10-CM

## 2019-09-11 ENCOUNTER — APPOINTMENT (OUTPATIENT)
Dept: HEMATOLOGY ONCOLOGY | Facility: CLINIC | Age: 54
End: 2019-09-11
Payer: COMMERCIAL

## 2019-09-11 VITALS
HEIGHT: 63.78 IN | TEMPERATURE: 98.1 F | DIASTOLIC BLOOD PRESSURE: 73 MMHG | RESPIRATION RATE: 16 BRPM | OXYGEN SATURATION: 100 % | WEIGHT: 161.38 LBS | BODY MASS INDEX: 27.89 KG/M2 | SYSTOLIC BLOOD PRESSURE: 122 MMHG | HEART RATE: 80 BPM

## 2019-09-11 DIAGNOSIS — Z87.19 PERSONAL HISTORY OF OTHER DISEASES OF THE DIGESTIVE SYSTEM: ICD-10-CM

## 2019-09-11 PROCEDURE — 99205 OFFICE O/P NEW HI 60 MIN: CPT

## 2019-09-15 PROBLEM — Z87.19 HISTORY OF FATTY INFILTRATION OF LIVER: Status: RESOLVED | Noted: 2019-09-15 | Resolved: 2019-09-15

## 2019-09-15 NOTE — CONSULT LETTER
[Dear  ___] : Dear  [unfilled], [Consult Letter:] : I had the pleasure of evaluating your patient, [unfilled]. [( Thank you for referring [unfilled] for consultation for _____ )] : Thank you for referring [unfilled] for consultation for [unfilled] [Please see my note below.] : Please see my note below. [Consult Closing:] : Thank you very much for allowing me to participate in the care of this patient.  If you have any questions, please do not hesitate to contact me. [Sincerely,] : Sincerely, [FreeTextEntry2] : Sherrie Morse MD\par [address] \[place] [address] [FreeTextEntry3] : Javed Bernal MD\par Attending\par [facility]\par  [DrCarlo  ___] : Dr. SHAW

## 2019-09-15 NOTE — ASSESSMENT
[FreeTextEntry1] : 53 y/o  F with Stage IB left breast cancer s/p bilateral mastectomy with sentinel lymph node biopsy. We reviewed her pathology and reviewed the significance of triple negative breast cancer. We reviewed the aggressive nature of this cancer. We explained the recommendations of breast cancer less than 0.5 cm of observation since there is not enough evidence to prove the benefits of treatment outweigh risks. She is willing to continue with follow up with Dr Morse since she understands there is small risk of systemic breast cancer recurrence. We reviewed signs and symptoms of breast cancer recurrence. We reviewed the WINS study for low fat diet and exercise to improve breast cancer survival and Vitamin D supplement. Questions answered to her satisfaction. She will continue with follow up with Dr Morse.

## 2019-09-15 NOTE — HISTORY OF PRESENT ILLNESS
[Disease: _____________________] : Disease: [unfilled] [T: ___] : T[unfilled] [N: ___] : N[unfilled] [AJCC Stage: ____] : AJCC Stage: [unfilled] [de-identified] : Age 54: left breast cancer \par Screening mammogram was negative and sonogram showed area of distortion and posterior acoustical shadowing in the 2:00 axis. She had left breast u/s guided biopsy which showed invasive ductal carcinoma SBR 5/9, ER negative, WI negative, Cnp1yit 3+ with Ki67 5% along with extensive DCIS. FISH was performed on the biopsy specimen which was negative. She had MRI of the breast on 7/31/19 which showed 2 cm linear area of focal nonmass clumped enhancement and L breast 3.1 x 1.5 cm focal nonmass enhancement and 0.6 cm circumscribed mass in the outer anterior left breast. On 8/19/19, she underwent bilateral mastectomy and sentinel lymph node biopsy. The pathology showed 0.4 cm ER negative, WI negative, [id number] 2+ but CISH negative along with DCIS 0.5 cm; sentinel lymph nodes were negative.  [de-identified] : invasive ductal carcinoma ER negative, LA negative, [id number] CISH negative  [de-identified] : G [de-identified] : She is healing well from reconstruction. She has no family history of breast cancer. She thinks she had genetic testing done and was negative. She is present to review adjuvant treatment options. She prefers not to be on any chemotherapy.

## 2019-09-15 NOTE — PHYSICAL EXAM
[Fully active, able to carry on all pre-disease performance without restriction] : Status 0 - Fully active, able to carry on all pre-disease performance without restriction [Normal] : grossly intact [de-identified] : bilateral mastectomy with ESA flap reconstruction [de-identified] : ESA flap scar healed

## 2019-09-27 NOTE — H&P PST ADULT - LAST ECHOCARDIOGRAM
Patient:   CORKY CHEEMA            MRN: [id number]            FIN: [id number]              Age:   71 years     Sex:  MALE     :  44   Associated Diagnoses:   None   Author:   HOLLY ALLEN     Subjective   Chief complaint.  Additional information Sitting up in chair, feeling well. No cp, sob.  To have CABG tomorrow.  Ambulatory throughout the room. .       Health Status   Allergies:    Allergic Reactions (All)  NKA   Current medications:    Medications (14) Active  Scheduled: (9)  Aspirin 325 mg DR tab  325 mg 1 tab, Oral, Daily  Atorvastatin 80 mg tab  80 mg 1 tab, Oral, Q Bedtime  insulin glargine  52 unit 0.52 mL, Subcutaneous, Q12H  Insulin human lispro 10 unit/0.1 mL inj  2-10 unit, Subcutaneous, TID [with meals]  Lisinopril 20 mg tab  20 mg 1 tab, Oral, Daily  Metoprolol succinate 50 mg XL tab  50 mg 1 tab, Oral, Daily  Multivitamin with minerals chew tab  1 tab, Chewed, Daily  NF  Medication  12.5 mg, Oral, Daily  Victoza subcutaneous injection 18 mg/3 mL  1.8 mg, Subcutaneous, Daily  Continuous: (0)  PRN: (5)  Acetaminophen 325 mg tab  650 mg 2 tab, Oral, Q4H  Dextrose (glucose) 40% 15 gm/37.5 gm oral gel UD  15 gm, Oral, As Directed PRN  Dextrose (glucose) 50% 25 gm/50 mL syringe  12.5 gm 25 mL, IV Push, As Directed PRN  Glucagon 1 mg/1 mL emergency kit SDV  1 mg 1 mL, IM, As Directed PRN  Sodium chloride PF 0.9% flush inj 10 mL  2 mL, Flush, As Directed PRN      Review of Systems   Cardiovascular:  No chest pain.    Respiratory:  No shortness of breath.    All other systems ROS reviewed as documented in chart.     Objective   VS/Measurements   Vital Signs   19 11:04 CDT Heart/Pulse Rate 85 beats/min  Normal    SpO2 98 %  Normal   19 11:04 CDT Respiration Rate 18 breaths/min  HI   19 11:03 CDT Temperature - VS 36.6 deg_C  Normal    Temperature Source - VS Oral   19 11:03 CDT NIBP Systolic 142 mm Hg  HI    NIBP Diastolic 81 mm Hg  Normal    NIBP Source Left Arm     NIBP MAP Manual Entry 102     ,    Vitals between:   02-JUN-2019 11:13:29   TO   03-JUN-2019 11:13:29                   LAST RESULT MINIMUM MAXIMUM  Temperature 36.6 36.5 36.7  Heart Rate 85 85 94  Respiratory Rate 18 16 18  NISBP           142 116 143  NIDBP           81 68 85  SpO2                    98 95 98  , Measurements from flowsheet : Height and Weight   06/03/19 05:59 CDT CLINICALWEIGHT 91.8 kg   06/02/19 06:35 CDT CLINICALWEIGHT 91.8 kg    Weight Method Measured    Weight Scale Chair Scale       General:  Alert and oriented.    Eye:  Normal conjunctiva.    HENT:  Normocephalic.    Neck:  Non-tender.    Respiratory:  Lungs are clear to auscultation, Respirations are non-labored, Breath sounds are equal, Symmetrical chest wall expansion.   Cardiovascular:  Normal rate, Regular rhythm, No murmur, No edema.   Gastrointestinal:  Non-tender.    Musculoskeletal:  Normal range of motion.    Integumentary:  Warm, Dry.    Neurologic:  Alert.    Psychiatric:  Cooperative.      Results Review   General results   Interpretation:   US VASC CAROTID DUPLEX DONNA  Result Date: May 31, 2019 19:46 CDT  Result Status: Auth (Verified)  Result Title: US VASC CAROTID DUPLEX DONNA  Performed By: ROSA LERMA on May 31, 2019 23:12 CDT  Verified By: ROSA LERMA on May 31, 2019 23:14 CDT  Encounter info: [id number], CND, Inpatient, 05/31/19 -   * Final Report *  Reason For Exam  needs cabg  RADRPT  EXAM: US VASC CAROTID DUPLEX DONNA  CLINICAL INDICATION:  Coronary artery disease.  Pre-CABG evaluation.  COMPARISON:  None.  TECHNIQUE: Grayscale ultrasound, as well as color and spectral Doppler of the extracranial arteries was performed.  FINDINGS:  Mild bilateral atherosclerotic plaque and calcifications noted involving common carotid arteries and carotid bulbs.  There is approximately 39 diameter reduction of the right common carotid artery.  Otherwise, extra cranial arteries are unremarkable, demonstrating normal  Doppler color flow, waveform and velocities.  CCA:  RIGHT: PSV 76cm/sec  EDV 26cm/sec  LEFT:   PSV 65cm/sec  EDV 15cm/sec  ICA PROXIMAL:  RIGHT: PSV 81cm/sec  EDV 18cm/sec  LEFT:   PSV 67cm/sec  EDV 14cm/sec  ICA MID:  RIGHT: PSV 87cm/sec  EDV 22cm/sec  LEFT:   PSV 92cm/sec  EDV 29cm/sec  ICA DISTAL:  RIGHT: PSV 79cm/sec  EDV 25cm/sec  LEFT:   PSV 74cm/sec  EDV 9cm/sec  ECA:  RIGHT: PSV 120cm/sec  EDV 14cm/sec  LEFT:   PSV 118cm/sec  EDV 12cm/sec  ICA/CCA VELOCITY RATIO:  RIGHT: 1.1  LEFT:   1.1  VERTEBRAL:  RIGHT: Antegrade  LEFT:   Antegrade  IMPRESSION:  Scattered atherosclerotic plaque and calcifications, without evidence of hemodynamically significant stenosis, as described above.  Signature Line  -----  F I N A L  -----  Transcribed By: EMELI   19 11:12 pm  Dictated By:            MARIA GUADALUPE, ROSA MANLEY  Electronically Reviewed and Approved By:           ROSA LERMA MD  19 11:14 pm    REPORT (Verified)  [place], [place]  CARDIOLOGY REPORT  NAME:             CORKY CHEEMA   AGE:          71  ACCT#:            [id number]         :          1948  MR#:              [id number]         ROOM:  ADMIT DATE:       2019        DIS DATE:     2019  PT TYPE:          O                 DP:           1599  ATTENDING:        YAZAN GUTIERREZ MD,  FACP  DICTATING PHYSICIAN:  JEANCARLOS ARROYO MD  DATE OF PROCEDURE:  2019  CARDIAC CATH  PRIMARY CARE PHYSICIAN:  Jermaine Gandara MD  PROCEDURE PERFORMED:  1. Left heart catheterization.  2. Selective left and right coronary arteriography.  3. Left ventriculography.  4. Left subclavian arteriography and LIMA angiography.  5. Right common femoral arteriography and Mynx closure device.  6. Moderate conscious sedation.  INDICATION:  Exertional angina, abnormal myocardial perfusion stress test,  coronary artery disease, cardiomyopathy.  DESCRIPTION OF PROCEDURE:  Informed consent was obtained.   Right common femoral  artery was accessed with ultrasound guidance.  A 6-Cymraes sheath was placed.  Selective left and right coronary arteriography was then performed followed by  left ventriculography and left subclavian arteriography.  Moderate conscious  sedation was administered during the case with Versed and fentanyl, supervised  by myself with hemodynamic monitoring.  Distal left main has a calcified 90% stenosis evident in the caudal views,  ostial left circumflex 90% calcified lesion.  The rest of the left circumflex  calcified with minimal luminal narrowing.  It is appropriate for target for  bypass surgery.  There are first and second obtuse marginal branches smaller in  size, patent, third obtuse marginal is larger, has proximal diffuse 30%  stenosis, then minimal luminal plaquing.  The left circumflex is dominant  supplying posterior descending artery, which is patent.  Ostial left anterior  descending has a 50% lesion, proximal 70% discrete lesion all calcified.  Rest  of LAD is generally patent.  Mid segment 30% stenosis.  LAD is appropriate  target for bypass grafting.  Obtuse marginal branches are patent.  Septal  perforators are patent.  There is first diagonal branch, which has proximal 70% lesion followed by a  moderate 50% lesion.  The diagonal branch is relatively large in size.  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  Right coronary artery is  small nondominant, has mid segment diffuse 70% lesion.  However, this is small  in size, probably not bypassable.  There is a large acute marginal branch,  which is patent.  Left subclavian artery is widely patent.  LIMA is patent.  Left ventriculography showed inferobasal hypokinesis, overall estimated  ejection fraction about 45%.  Slight apical hypokinesis is noted.  No  significant mitral regurgitation is seen.  At that point, Mynx closure device  was deployed successfully after confirming adequate sheath position.  No mitral   regurgitation was seen.  IMPRESSION:  1. Multivessel severe calcified coronary artery disease.  2. Distal left main 90% lesion.  3. Ostial left circumflex 90% calcified lesion.  4. Ostial LAD 50% lesion followed by 70% lesion mid LAD.  5. First diagonal branch large, has proximal 70% lesion.  6. 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  7. Small nondominant right coronary artery with 70% mid lesion.  8. Mild left ventricular systolic dysfunction estimated ejection fraction 45%.  9. Patent left subclavian artery and patent LIMA.  PLAN:  Coronary artery bypass surgery is planned.  I have consulted CV surgery  team, they will see the patient.  He will be admitted to the hospital.  We are  planning bypass on Monday.  MD FIONA Choi/Yury  DP:  1599  DD:  2019 10:22:16  DT:  2019 14:11:30  Job #:  [id number]    Signature Line  Electronically Signed On 2019 18:10  __________________________________________________   DINA-JEANCARLOS MANLEY    Echocardiogram - Adult CND  *[facility]*  *[facility]*  [address] [place], [place] [address] [phone]  Transthoracic Echocardiogram (TTE)  Patient: Jossue Rosales    Study Date/Time:      May 31 2019 1:00PM  MRN:     [id number]          FIN#:                 [id number]  :     1948         Ht/Wt:                157cm 94.3kg  Age:     71                 BSA/BMI:              2.08m2 38.3kg/m2  Gender:  M                  Baseline BP:          127 / 80  Ordering Physician:    Jeancarlos Valente MD  Referring Physician:   LARS Gandara  Attending Physician:   LARS Gandara  Diagnostic Physician:  Jeancarlos Valente MD  Sonographer:           FÉLIX Garcia  --------------------------------------------------------------------------  INDICATIONS:   Coronary artery disease.  --------------------------------------------------------------------------  STUDY CONCLUSIONS  SUMMARY:  1. Left ventricle: The cavity size is  normal. Wall thickness is normal.    Systolic function is mildly reduced. The estimated ejection fraction is    45%. Hypokinesis of the inferior myocardium. Doppler parameters are    consistent with abnormal left ventricular relaxation (grade 1 diastolic    dysfunction).  2. Normal valvular function.          Impression and Plan   Assessment and Plan:       Diagnosis:   IMPRESSION:  1. Severe multivessel coronary artery disease involving distal left main,  proximal left anterior descending, and ostial left circumflex with left  dominant circulation.  2. Mild ischemic cardiomyopathy, estimated ejection fraction 45% on his cardiac  cath.  3. Non-insulin-dependent diabetes mellitus and hypertension.  4. Previous stroke and has been on dual antiplatelet therapy since then.  5. Previous extensive smoking.  PLAN:   Carotid us mild plaques.  CABG is planned Tuesday (tomorrow) to allow enough time off Plavix.  Echo LVEF 45%, valves fine.  No further chest pain. Ambulatory without symptoms  We will follow perioperatively.   .   a while ago

## 2019-10-31 ENCOUNTER — OUTPATIENT (OUTPATIENT)
Dept: OUTPATIENT SERVICES | Facility: HOSPITAL | Age: 54
LOS: 1 days | End: 2019-10-31
Payer: COMMERCIAL

## 2019-10-31 VITALS
HEIGHT: 63.5 IN | HEART RATE: 82 BPM | SYSTOLIC BLOOD PRESSURE: 129 MMHG | TEMPERATURE: 98 F | OXYGEN SATURATION: 100 % | WEIGHT: 163.58 LBS | DIASTOLIC BLOOD PRESSURE: 63 MMHG | RESPIRATION RATE: 16 BRPM

## 2019-10-31 DIAGNOSIS — Z85.3 PERSONAL HISTORY OF MALIGNANT NEOPLASM OF BREAST: ICD-10-CM

## 2019-10-31 DIAGNOSIS — Z98.891 HISTORY OF UTERINE SCAR FROM PREVIOUS SURGERY: Chronic | ICD-10-CM

## 2019-10-31 DIAGNOSIS — Z98.51 TUBAL LIGATION STATUS: Chronic | ICD-10-CM

## 2019-10-31 DIAGNOSIS — Z90.13 ACQUIRED ABSENCE OF BILATERAL BREASTS AND NIPPLES: ICD-10-CM

## 2019-10-31 DIAGNOSIS — Z90.13 ACQUIRED ABSENCE OF BILATERAL BREASTS AND NIPPLES: Chronic | ICD-10-CM

## 2019-10-31 DIAGNOSIS — Z01.818 ENCOUNTER FOR OTHER PREPROCEDURAL EXAMINATION: ICD-10-CM

## 2019-10-31 LAB
ANION GAP SERPL CALC-SCNC: 6 MMOL/L — SIGNIFICANT CHANGE UP (ref 5–17)
BUN SERPL-MCNC: 14 MG/DL — SIGNIFICANT CHANGE UP (ref 7–23)
CALCIUM SERPL-MCNC: 10 MG/DL — SIGNIFICANT CHANGE UP (ref 8.4–10.5)
CHLORIDE SERPL-SCNC: 106 MMOL/L — SIGNIFICANT CHANGE UP (ref 96–108)
CO2 SERPL-SCNC: 29 MMOL/L — SIGNIFICANT CHANGE UP (ref 22–31)
CREAT SERPL-MCNC: 0.78 MG/DL — SIGNIFICANT CHANGE UP (ref 0.5–1.3)
GLUCOSE SERPL-MCNC: 94 MG/DL — SIGNIFICANT CHANGE UP (ref 70–99)
HCT VFR BLD CALC: 38.9 % — SIGNIFICANT CHANGE UP (ref 34.5–45)
HGB BLD-MCNC: 12 G/DL — SIGNIFICANT CHANGE UP (ref 11.5–15.5)
MCHC RBC-ENTMCNC: 25.2 PG — LOW (ref 27–34)
MCHC RBC-ENTMCNC: 30.8 GM/DL — LOW (ref 32–36)
MCV RBC AUTO: 81.7 FL — SIGNIFICANT CHANGE UP (ref 80–100)
NRBC # BLD: 0 /100 WBCS — SIGNIFICANT CHANGE UP (ref 0–0)
PLATELET # BLD AUTO: 359 K/UL — SIGNIFICANT CHANGE UP (ref 150–400)
POTASSIUM SERPL-MCNC: 4.7 MMOL/L — SIGNIFICANT CHANGE UP (ref 3.5–5.3)
POTASSIUM SERPL-SCNC: 4.7 MMOL/L — SIGNIFICANT CHANGE UP (ref 3.5–5.3)
RBC # BLD: 4.76 M/UL — SIGNIFICANT CHANGE UP (ref 3.8–5.2)
RBC # FLD: 13.8 % — SIGNIFICANT CHANGE UP (ref 10.3–14.5)
SODIUM SERPL-SCNC: 141 MMOL/L — SIGNIFICANT CHANGE UP (ref 135–145)
WBC # BLD: 8.3 K/UL — SIGNIFICANT CHANGE UP (ref 3.8–10.5)
WBC # FLD AUTO: 8.3 K/UL — SIGNIFICANT CHANGE UP (ref 3.8–10.5)

## 2019-10-31 PROCEDURE — 80048 BASIC METABOLIC PNL TOTAL CA: CPT

## 2019-10-31 PROCEDURE — 36415 COLL VENOUS BLD VENIPUNCTURE: CPT

## 2019-10-31 PROCEDURE — 85027 COMPLETE CBC AUTOMATED: CPT

## 2019-10-31 PROCEDURE — G0463: CPT

## 2019-10-31 RX ORDER — SODIUM CHLORIDE 9 MG/ML
1000 INJECTION, SOLUTION INTRAVENOUS
Refills: 0 | Status: DISCONTINUED | OUTPATIENT
Start: 2019-11-14 | End: 2019-11-14

## 2019-10-31 NOTE — H&P PST ADULT - HISTORY OF PRESENT ILLNESS
55 y/o female with medical h/o undergoing Bilateral mastectomies with iliana/reconstruction on 08/19/19 for h/o breast cancer and returned today for PST for Bilateral Breast Revision Reconstruction, Nipple and Areolar Reconstruction, Other Tissue Grafts scheduled for 11/14/2019 53 y/o female reports undergoing Bilateral mastectomy with iliana/reconstruction on 08/19/19 for h/o breast cancer and returned today for PST for Bilateral Breast Revision Reconstruction, Nipple and Areolar Reconstruction, Other Tissue Grafts scheduled for 11/14/2019

## 2019-10-31 NOTE — H&P PST ADULT - NSICDXPROBLEM_GEN_ALL_CORE_FT
PROBLEM DIAGNOSES  Problem: Personal history of malignant neoplasm of breast  Assessment and Plan: Pre-op instructiosn given. Pt verbalized understanding  Pepcid & Chlorhexidne wash instrcutions given PROBLEM DIAGNOSES  Problem: Personal history of malignant neoplasm of breast  Assessment and Plan: Pre-op instructions given. Pt verbalized understanding  Pepcid & Chlorhexidene wash instructions given

## 2019-10-31 NOTE — H&P PST ADULT - PSYCHIATRIC DETAILS
Plastic Surgeon Procedure Text (A): After obtaining clear surgical margins the patient was sent to plastics for surgical repair.  The patient understands they will receive post-surgical care and follow-up from the referring physician's office. normal behavior/normal affect

## 2019-10-31 NOTE — H&P PST ADULT - MUSCULOSKELETAL
negative detailed exam no calf tenderness/no joint swelling/no joint erythema/no joint warmth/ROM intact/normal strength

## 2019-10-31 NOTE — H&P PST ADULT - NSANTHOSAYNRD_GEN_A_CORE
neck 15.5inches/No. LIAM screening performed.  STOP BANG Legend: 0-2 = LOW Risk; 3-4 = INTERMEDIATE Risk; 5-8 = HIGH Risk

## 2019-11-13 ENCOUNTER — TRANSCRIPTION ENCOUNTER (OUTPATIENT)
Age: 54
End: 2019-11-13

## 2019-11-14 ENCOUNTER — RESULT REVIEW (OUTPATIENT)
Age: 54
End: 2019-11-14

## 2019-11-14 ENCOUNTER — OUTPATIENT (OUTPATIENT)
Dept: INPATIENT UNIT | Facility: HOSPITAL | Age: 54
LOS: 1 days | End: 2019-11-14
Payer: COMMERCIAL

## 2019-11-14 VITALS
DIASTOLIC BLOOD PRESSURE: 76 MMHG | HEIGHT: 63.5 IN | OXYGEN SATURATION: 99 % | WEIGHT: 163.58 LBS | SYSTOLIC BLOOD PRESSURE: 138 MMHG | TEMPERATURE: 98 F | HEART RATE: 72 BPM | RESPIRATION RATE: 16 BRPM

## 2019-11-14 VITALS
SYSTOLIC BLOOD PRESSURE: 128 MMHG | OXYGEN SATURATION: 98 % | HEART RATE: 78 BPM | RESPIRATION RATE: 16 BRPM | TEMPERATURE: 97 F | DIASTOLIC BLOOD PRESSURE: 70 MMHG

## 2019-11-14 DIAGNOSIS — Z85.3 PERSONAL HISTORY OF MALIGNANT NEOPLASM OF BREAST: ICD-10-CM

## 2019-11-14 DIAGNOSIS — Z98.891 HISTORY OF UTERINE SCAR FROM PREVIOUS SURGERY: Chronic | ICD-10-CM

## 2019-11-14 DIAGNOSIS — Z90.13 ACQUIRED ABSENCE OF BILATERAL BREASTS AND NIPPLES: ICD-10-CM

## 2019-11-14 DIAGNOSIS — Z90.13 ACQUIRED ABSENCE OF BILATERAL BREASTS AND NIPPLES: Chronic | ICD-10-CM

## 2019-11-14 DIAGNOSIS — Z98.51 TUBAL LIGATION STATUS: Chronic | ICD-10-CM

## 2019-11-14 PROCEDURE — 88304 TISSUE EXAM BY PATHOLOGIST: CPT | Mod: 26

## 2019-11-14 PROCEDURE — 88304 TISSUE EXAM BY PATHOLOGIST: CPT

## 2019-11-14 PROCEDURE — 19350 NIPPLE/AREOLA RECONSTRUCTION: CPT | Mod: 50

## 2019-11-14 PROCEDURE — 19366: CPT | Mod: 50

## 2019-11-14 PROCEDURE — 15200 FTH/GFT FR TRNK 20 SQ CM/<: CPT

## 2019-11-14 PROCEDURE — 15002 WOUND PREP TRK/ARM/LEG: CPT

## 2019-11-14 RX ORDER — ONDANSETRON 8 MG/1
4 TABLET, FILM COATED ORAL ONCE
Refills: 0 | Status: DISCONTINUED | OUTPATIENT
Start: 2019-11-14 | End: 2019-11-14

## 2019-11-14 RX ORDER — OXYCODONE AND ACETAMINOPHEN 5; 325 MG/1; MG/1
1 TABLET ORAL EVERY 4 HOURS
Refills: 0 | Status: DISCONTINUED | OUTPATIENT
Start: 2019-11-14 | End: 2019-11-14

## 2019-11-14 RX ORDER — SODIUM CHLORIDE 9 MG/ML
1000 INJECTION, SOLUTION INTRAVENOUS
Refills: 0 | Status: DISCONTINUED | OUTPATIENT
Start: 2019-11-14 | End: 2019-11-14

## 2019-11-14 RX ORDER — HYDROMORPHONE HYDROCHLORIDE 2 MG/ML
1 INJECTION INTRAMUSCULAR; INTRAVENOUS; SUBCUTANEOUS
Refills: 0 | Status: DISCONTINUED | OUTPATIENT
Start: 2019-11-14 | End: 2019-11-14

## 2019-11-14 RX ORDER — SODIUM CHLORIDE 9 MG/ML
1000 INJECTION, SOLUTION INTRAVENOUS
Refills: 0 | Status: DISCONTINUED | OUTPATIENT
Start: 2019-11-14 | End: 2019-11-30

## 2019-11-14 RX ORDER — HYDROMORPHONE HYDROCHLORIDE 2 MG/ML
0.5 INJECTION INTRAMUSCULAR; INTRAVENOUS; SUBCUTANEOUS
Refills: 0 | Status: DISCONTINUED | OUTPATIENT
Start: 2019-11-14 | End: 2019-11-14

## 2019-11-14 RX ORDER — MULTIVIT-MIN/FERROUS GLUCONATE 9 MG/15 ML
1 LIQUID (ML) ORAL
Qty: 0 | Refills: 0 | DISCHARGE

## 2019-11-14 RX ADMIN — HYDROMORPHONE HYDROCHLORIDE 0.5 MILLIGRAM(S): 2 INJECTION INTRAMUSCULAR; INTRAVENOUS; SUBCUTANEOUS at 16:40

## 2019-11-14 RX ADMIN — SODIUM CHLORIDE 50 MILLILITER(S): 9 INJECTION, SOLUTION INTRAVENOUS at 12:57

## 2019-11-14 RX ADMIN — HYDROMORPHONE HYDROCHLORIDE 0.5 MILLIGRAM(S): 2 INJECTION INTRAMUSCULAR; INTRAVENOUS; SUBCUTANEOUS at 16:30

## 2019-11-14 NOTE — ASU DISCHARGE PLAN (ADULT/PEDIATRIC) - CARE PROVIDER_API CALL
Bill Abdullahi)  Plastic Surgery  [address]  [place] [address]  Phone: [phone]  Fax: [phone]  Follow Up Time:

## 2019-11-14 NOTE — BRIEF OPERATIVE NOTE - NSICDXBRIEFPROCEDURE_GEN_ALL_CORE_FT
PROCEDURES:  Revision of procedure involving breast with reconstruction of nipple 14-Nov-2019 16:44:43  Abhishek Levine  Breast revision surgery 14-Nov-2019 16:44:16  Abhishek Levine

## 2019-11-14 NOTE — ASU DISCHARGE PLAN (ADULT/PEDIATRIC) - CALL YOUR DOCTOR IF YOU HAVE ANY OF THE FOLLOWING:
Wound/Surgical Site with redness, or foul smelling discharge or pus/Bleeding that does not stop/Swelling that gets worse

## 2020-05-13 NOTE — REVIEW OF SYSTEMS
Care Everywhere: updated  Immunization: n/a  Health Maintenance: n/a  Media Review: n/a  Legacy Review: n/a  Order placed: n/a  Upcoming appts:n/a        
[Negative] : Allergic/Immunologic

## 2020-08-24 ENCOUNTER — RESULT REVIEW (OUTPATIENT)
Age: 55
End: 2020-08-24

## 2020-09-01 ENCOUNTER — OUTPATIENT (OUTPATIENT)
Dept: OUTPATIENT SERVICES | Facility: HOSPITAL | Age: 55
LOS: 1 days | End: 2020-09-01
Payer: COMMERCIAL

## 2020-09-01 ENCOUNTER — APPOINTMENT (OUTPATIENT)
Dept: MRI IMAGING | Facility: IMAGING CENTER | Age: 55
End: 2020-09-01
Payer: COMMERCIAL

## 2020-09-01 DIAGNOSIS — Z98.891 HISTORY OF UTERINE SCAR FROM PREVIOUS SURGERY: Chronic | ICD-10-CM

## 2020-09-01 DIAGNOSIS — Z00.8 ENCOUNTER FOR OTHER GENERAL EXAMINATION: ICD-10-CM

## 2020-09-01 DIAGNOSIS — Z98.51 TUBAL LIGATION STATUS: Chronic | ICD-10-CM

## 2020-09-01 DIAGNOSIS — Z90.13 ACQUIRED ABSENCE OF BILATERAL BREASTS AND NIPPLES: Chronic | ICD-10-CM

## 2020-09-01 PROCEDURE — C8937: CPT

## 2020-09-01 PROCEDURE — A9585: CPT

## 2020-09-01 PROCEDURE — 77049 MRI BREAST C-+ W/CAD BI: CPT | Mod: 26

## 2020-09-01 PROCEDURE — C8908: CPT

## 2020-09-04 ENCOUNTER — TRANSCRIPTION ENCOUNTER (OUTPATIENT)
Age: 55
End: 2020-09-04

## 2020-09-09 ENCOUNTER — RESULT REVIEW (OUTPATIENT)
Age: 55
End: 2020-09-09

## 2020-09-09 ENCOUNTER — APPOINTMENT (OUTPATIENT)
Dept: MAMMOGRAPHY | Facility: IMAGING CENTER | Age: 55
End: 2020-09-09
Payer: COMMERCIAL

## 2020-09-09 ENCOUNTER — OUTPATIENT (OUTPATIENT)
Dept: OUTPATIENT SERVICES | Facility: HOSPITAL | Age: 55
LOS: 1 days | End: 2020-09-09
Payer: COMMERCIAL

## 2020-09-09 ENCOUNTER — APPOINTMENT (OUTPATIENT)
Dept: ULTRASOUND IMAGING | Facility: IMAGING CENTER | Age: 55
End: 2020-09-09
Payer: COMMERCIAL

## 2020-09-09 DIAGNOSIS — Z98.51 TUBAL LIGATION STATUS: Chronic | ICD-10-CM

## 2020-09-09 DIAGNOSIS — Z90.13 ACQUIRED ABSENCE OF BILATERAL BREASTS AND NIPPLES: Chronic | ICD-10-CM

## 2020-09-09 DIAGNOSIS — Z00.8 ENCOUNTER FOR OTHER GENERAL EXAMINATION: ICD-10-CM

## 2020-09-09 DIAGNOSIS — Z98.891 HISTORY OF UTERINE SCAR FROM PREVIOUS SURGERY: Chronic | ICD-10-CM

## 2020-09-09 PROCEDURE — 19083 BX BREAST 1ST LESION US IMAG: CPT | Mod: LT

## 2020-09-09 PROCEDURE — 19084 BX BREAST ADD LESION US IMAG: CPT | Mod: LT

## 2020-09-09 PROCEDURE — G0279: CPT | Mod: 26

## 2020-09-09 PROCEDURE — 77065 DX MAMMO INCL CAD UNI: CPT | Mod: 26,LT,76

## 2020-09-09 PROCEDURE — 77065 DX MAMMO INCL CAD UNI: CPT

## 2020-09-09 PROCEDURE — A4648: CPT

## 2020-09-09 PROCEDURE — 88305 TISSUE EXAM BY PATHOLOGIST: CPT

## 2020-09-09 PROCEDURE — 88305 TISSUE EXAM BY PATHOLOGIST: CPT | Mod: 26

## 2020-09-09 PROCEDURE — 19083 BX BREAST 1ST LESION US IMAG: CPT

## 2020-09-09 PROCEDURE — G0279: CPT

## 2020-09-09 PROCEDURE — 19084 BX BREAST ADD LESION US IMAG: CPT

## 2020-09-10 LAB — SURGICAL PATHOLOGY STUDY: SIGNIFICANT CHANGE UP

## 2020-10-24 NOTE — ASU PATIENT PROFILE, ADULT - AS SC BRADEN ACTIVITY
SpO2 was 82% on 30L 80% Vapotherm. Vapotherm increased to 35L and 90%. SpO2 now 92%. (4) walks frequently

## 2021-08-30 ENCOUNTER — APPOINTMENT (OUTPATIENT)
Dept: GASTROENTEROLOGY | Facility: CLINIC | Age: 56
End: 2021-08-30
Payer: COMMERCIAL

## 2021-08-30 VITALS
BODY MASS INDEX: 28.69 KG/M2 | SYSTOLIC BLOOD PRESSURE: 118 MMHG | RESPIRATION RATE: 18 BRPM | DIASTOLIC BLOOD PRESSURE: 82 MMHG | TEMPERATURE: 96 F | HEART RATE: 78 BPM | WEIGHT: 166 LBS | HEIGHT: 63.78 IN | OXYGEN SATURATION: 99 %

## 2021-08-30 DIAGNOSIS — Z82.49 FAMILY HISTORY OF ISCHEMIC HEART DISEASE AND OTHER DISEASES OF THE CIRCULATORY SYSTEM: ICD-10-CM

## 2021-08-30 PROCEDURE — 99204 OFFICE O/P NEW MOD 45 MIN: CPT

## 2021-08-30 RX ORDER — MULTIVIT-MIN/FA/LYCOPEN/LUTEIN .4-300-25
TABLET ORAL
Refills: 0 | Status: ACTIVE | COMMUNITY

## 2021-08-30 NOTE — HISTORY OF PRESENT ILLNESS
[Heartburn] : denies heartburn [Nausea] : denies nausea [Vomiting] : denies vomiting [Diarrhea] : denies diarrhea [Constipation] : denies constipation [Yellow Skin Or Eyes (Jaundice)] : denies jaundice [Abdominal Pain] : denies abdominal pain [Abdominal Swelling] : denies abdominal swelling [Rectal Pain] : denies rectal pain [Wt Gain ___ Lbs] : no recent weight gain [Wt Loss ___ Lbs] : no recent weight loss [GERD] : no gastroesophageal reflux disease [Hiatus Hernia] : no hiatus hernia [Peptic Ulcer Disease] : no peptic ulcer disease [Pancreatitis] : no pancreatitis [Cholelithiasis] : no cholelithiasis [Kidney Stone] : no kidney stone [Inflammatory Bowel Disease] : no inflammatory bowel disease [Irritable Bowel Syndrome] : no irritable bowel syndrome [Diverticulitis] : no diverticulitis [Alcohol Abuse] : no alcohol abuse [Malignancy] : malignancy [Abdominal Surgery] : no abdominal surgery [Appendectomy] : no appendectomy [Cholecystectomy] : no cholecystectomy [de-identified] : 56 year old woman referred for a screening colonoscopy. This will be her first colonoscopy. She denies rectal bleeding, melena or hematemesis. She is s/p bilateral mastectomy for breast cancer 1 year ago.

## 2021-11-15 ENCOUNTER — APPOINTMENT (OUTPATIENT)
Dept: GASTROENTEROLOGY | Facility: CLINIC | Age: 56
End: 2021-11-15

## 2021-11-18 ENCOUNTER — APPOINTMENT (OUTPATIENT)
Dept: GASTROENTEROLOGY | Facility: AMBULATORY MEDICAL SERVICES | Age: 56
End: 2021-11-18

## 2022-07-25 ENCOUNTER — OUTPATIENT (OUTPATIENT)
Dept: OUTPATIENT SERVICES | Facility: HOSPITAL | Age: 57
LOS: 1 days | Discharge: HOME | End: 2022-07-25

## 2022-07-25 DIAGNOSIS — Z98.891 HISTORY OF UTERINE SCAR FROM PREVIOUS SURGERY: Chronic | ICD-10-CM

## 2022-07-25 DIAGNOSIS — C50.919 MALIGNANT NEOPLASM OF UNSPECIFIED SITE OF UNSPECIFIED FEMALE BREAST: ICD-10-CM

## 2022-07-25 DIAGNOSIS — Z90.13 ACQUIRED ABSENCE OF BILATERAL BREASTS AND NIPPLES: Chronic | ICD-10-CM

## 2022-07-25 DIAGNOSIS — Z98.51 TUBAL LIGATION STATUS: Chronic | ICD-10-CM

## 2022-08-03 ENCOUNTER — APPOINTMENT (OUTPATIENT)
Dept: HEMATOLOGY ONCOLOGY | Facility: CLINIC | Age: 57
End: 2022-08-03

## 2022-08-03 VITALS
OXYGEN SATURATION: 100 % | HEIGHT: 63 IN | SYSTOLIC BLOOD PRESSURE: 130 MMHG | DIASTOLIC BLOOD PRESSURE: 84 MMHG | BODY MASS INDEX: 29.22 KG/M2 | RESPIRATION RATE: 16 BRPM | TEMPERATURE: 97.2 F | HEART RATE: 71 BPM | WEIGHT: 164.91 LBS

## 2022-08-03 PROCEDURE — 99214 OFFICE O/P EST MOD 30 MIN: CPT

## 2022-08-03 RX ORDER — SODIUM SULFATE, POTASSIUM SULFATE, MAGNESIUM SULFATE 17.5; 3.13; 1.6 G/ML; G/ML; G/ML
17.5-3.13-1.6 SOLUTION, CONCENTRATE ORAL
Qty: 1 | Refills: 0 | Status: DISCONTINUED | COMMUNITY
Start: 2021-08-30 | End: 2022-08-03

## 2022-08-03 NOTE — CONSULT LETTER
[Dear  ___] : Dear  [unfilled], [Courtesy Letter:] : I had the pleasure of seeing your patient, [unfilled], in my office today. [Please see my note below.] : Please see my note below. [Consult Closing:] : Thank you very much for allowing me to participate in the care of this patient.  If you have any questions, please do not hesitate to contact me. [Sincerely,] : Sincerely, [FreeTextEntry2] : Sherrie Morse MD\par [address] \[place] [address]\par  [FreeTextEntry3] : Javed Bernal MD\par Attending\par [facility]\par  [DrCarlo  ___] : Dr. SHAW

## 2022-08-03 NOTE — HISTORY OF PRESENT ILLNESS
[Disease: _____________________] : Disease: [unfilled] [T: ___] : T[unfilled] [N: ___] : N[unfilled] [AJCC Stage: ____] : AJCC Stage: [unfilled] [de-identified] : Age 54: left breast cancer \par Screening mammogram was negative and sonogram showed area of distortion and posterior acoustical shadowing in the 2:00 axis. She had left breast u/s guided biopsy which showed invasive ductal carcinoma SBR 5/9, ER negative, ID negative, [id number] 3+ with Ki67 5% along with extensive DCIS. FISH was performed on the biopsy specimen which was negative. She had MRI of the breast on 7/31/19 which showed 2 cm linear area of focal nonmass clumped enhancement and L breast 3.1 x 1.5 cm focal nonmass enhancement and 0.6 cm circumscribed mass in the outer anterior left breast. On 8/19/19, she underwent bilateral mastectomy and sentinel lymph node biopsy. The pathology showed 0.4 cm ER negative, ID negative, Cdc8mgg 2+ but CISH negative along with DCIS 0.5 cm; sentinel lymph nodes were negative.  [de-identified] : invasive ductal carcinoma ER negative, NH negative, Dlj9pvd CISH negative  [de-identified] : Bautista done 2019: VUS MILAN [id number]; no actionable mutations  [de-identified] : Since last follow up 2019, she denies any new health changes. No new FH of any cancer. She saw her PCP last week and had blood work done. Denies any new breast pain or chest wall changes. No back pain, cough or HA. No new medications: takes MVI only. Will be having colonoscopy next month. Will be seeing Dr Morse tomorrow. Has not seen GYN; no bloating or abdominal pain.

## 2022-08-03 NOTE — ASSESSMENT
[FreeTextEntry1] : 53 y/o BRCA negative  F with Stage IB left breast cancer s/p bilateral mastectomy with sentinel lymph node biopsy. We reviewed signs and symptoms of breast cancer recurrence. No new signs or symptoms of recurrence. We reviewed low fat diet and exercise daily to help improve breast cancer survival as per WINS study: gave information for nutritionist and nutrition sheet. She is up to date with PCP and GI and will be seeing Dr Morse. Will obtain copy of blood work done recently. Next follow up in 6 months but earlier if any new symptoms.\par

## 2022-08-03 NOTE — PHYSICAL EXAM
Patient ate prior to being transferred her. He has been informed that he will be nothing by mouth until after breakfast until he has his liver ultrasound   [Fully active, able to carry on all pre-disease performance without restriction] : Status 0 - Fully active, able to carry on all pre-disease performance without restriction [Normal] : affect appropriate [de-identified] : bilateral mastectomy with ESA flap reconstruction; area of fat necrosis 9:00 L reconstruction; no palpable axillary LN  [de-identified] : ESA flap scar healed

## 2022-08-08 ENCOUNTER — APPOINTMENT (OUTPATIENT)
Dept: GASTROENTEROLOGY | Facility: CLINIC | Age: 57
End: 2022-08-08

## 2022-08-08 VITALS
RESPIRATION RATE: 17 BRPM | DIASTOLIC BLOOD PRESSURE: 77 MMHG | HEIGHT: 63 IN | SYSTOLIC BLOOD PRESSURE: 132 MMHG | WEIGHT: 164 LBS | HEART RATE: 82 BPM | BODY MASS INDEX: 29.06 KG/M2 | TEMPERATURE: 96.7 F | OXYGEN SATURATION: 98 %

## 2022-08-08 DIAGNOSIS — Z12.11 ENCOUNTER FOR SCREENING FOR MALIGNANT NEOPLASM OF COLON: ICD-10-CM

## 2022-08-08 DIAGNOSIS — Z20.822 ENCOUNTER FOR PREPROCEDURAL LABORATORY EXAMINATION: ICD-10-CM

## 2022-08-08 DIAGNOSIS — Z01.812 ENCOUNTER FOR PREPROCEDURAL LABORATORY EXAMINATION: ICD-10-CM

## 2022-08-08 PROCEDURE — 99213 OFFICE O/P EST LOW 20 MIN: CPT

## 2022-08-08 NOTE — HISTORY OF PRESENT ILLNESS
[de-identified] : 57-year-old woman with breast cancer returns to schedule a screening colonoscopy.  This will be her first colonoscopy.  She denies rectal bleeding, melena or hematemesis.  She is otherwise in good health

## 2022-08-08 NOTE — ASSESSMENT
[FreeTextEntry1] : Screening colonoscopy.  The importance of colon cancer screening and the colonoscopy prep was discussed with the patient.  She understands and all questions were answered.

## 2023-02-13 ENCOUNTER — LABORATORY RESULT (OUTPATIENT)
Age: 58
End: 2023-02-13

## 2023-02-13 ENCOUNTER — APPOINTMENT (OUTPATIENT)
Dept: GASTROENTEROLOGY | Facility: CLINIC | Age: 58
End: 2023-02-13

## 2023-02-15 ENCOUNTER — OUTPATIENT (OUTPATIENT)
Dept: OUTPATIENT SERVICES | Facility: HOSPITAL | Age: 58
LOS: 1 days | Discharge: HOME | End: 2023-02-15

## 2023-02-15 DIAGNOSIS — Z90.13 ACQUIRED ABSENCE OF BILATERAL BREASTS AND NIPPLES: Chronic | ICD-10-CM

## 2023-02-15 DIAGNOSIS — C50.919 MALIGNANT NEOPLASM OF UNSPECIFIED SITE OF UNSPECIFIED FEMALE BREAST: ICD-10-CM

## 2023-02-15 DIAGNOSIS — Z98.51 TUBAL LIGATION STATUS: Chronic | ICD-10-CM

## 2023-02-15 DIAGNOSIS — Z98.891 HISTORY OF UTERINE SCAR FROM PREVIOUS SURGERY: Chronic | ICD-10-CM

## 2023-02-16 ENCOUNTER — APPOINTMENT (OUTPATIENT)
Dept: GASTROENTEROLOGY | Facility: AMBULATORY MEDICAL SERVICES | Age: 58
End: 2023-02-16
Payer: COMMERCIAL

## 2023-02-16 PROCEDURE — 45380 COLONOSCOPY AND BIOPSY: CPT | Mod: 33

## 2023-02-22 ENCOUNTER — APPOINTMENT (OUTPATIENT)
Dept: HEMATOLOGY ONCOLOGY | Facility: CLINIC | Age: 58
End: 2023-02-22
Payer: COMMERCIAL

## 2023-02-22 VITALS
BODY MASS INDEX: 29.06 KG/M2 | HEART RATE: 81 BPM | TEMPERATURE: 97.2 F | DIASTOLIC BLOOD PRESSURE: 83 MMHG | WEIGHT: 164.02 LBS | RESPIRATION RATE: 16 BRPM | SYSTOLIC BLOOD PRESSURE: 134 MMHG | OXYGEN SATURATION: 97 %

## 2023-02-22 PROCEDURE — 99213 OFFICE O/P EST LOW 20 MIN: CPT

## 2023-02-22 NOTE — PHYSICAL EXAM
[Fully active, able to carry on all pre-disease performance without restriction] : Status 0 - Fully active, able to carry on all pre-disease performance without restriction [Normal] : affect appropriate [de-identified] : bilateral mastectomy with ESA flap reconstruction; area of fat necrosis 9:00 L reconstruction; no palpable axillary LN  [de-identified] : ESA flap scar

## 2023-02-22 NOTE — ASSESSMENT
[FreeTextEntry1] : 56 y/o BRCA negative  F with Stage IB left breast cancer s/p bilateral mastectomy with sentinel lymph node biopsy. We reviewed signs and symptoms of breast cancer recurrence. No new signs or symptoms of recurrence. We reviewed low fat diet and exercise daily to help improve breast cancer survival as per WINS study. Next follow up in 1 year but earlier if any new symptoms.\par

## 2023-02-22 NOTE — CONSULT LETTER
[Dear  ___] : Dear  [unfilled], [Courtesy Letter:] : I had the pleasure of seeing your patient, [unfilled], in my office today. [Please see my note below.] : Please see my note below. [Consult Closing:] : Thank you very much for allowing me to participate in the care of this patient.  If you have any questions, please do not hesitate to contact me. [Sincerely,] : Sincerely, [FreeTextEntry2] : Sherrie Morse MD\par [address] \[place] [address]\par  [FreeTextEntry3] : Javed Bernal MD\par Attending\par [facility]\par

## 2023-02-22 NOTE — HISTORY OF PRESENT ILLNESS
[Disease: _____________________] : Disease: [unfilled] [T: ___] : T[unfilled] [N: ___] : N[unfilled] [AJCC Stage: ____] : AJCC Stage: [unfilled] [de-identified] : Age 54: left breast cancer \par Screening mammogram was negative and sonogram showed area of distortion and posterior acoustical shadowing in the 2:00 axis. She had left breast u/s guided biopsy which showed invasive ductal carcinoma SBR 5/9, ER negative, SC negative, Vvj1ngc 3+ with Ki67 5% along with extensive DCIS. FISH was performed on the biopsy specimen which was negative. She had MRI of the breast on 7/31/19 which showed 2 cm linear area of focal nonmass clumped enhancement and L breast 3.1 x 1.5 cm focal nonmass enhancement and 0.6 cm circumscribed mass in the outer anterior left breast. On 8/19/19, she underwent bilateral mastectomy and sentinel lymph node biopsy. The pathology showed 0.4 cm ER negative, SC negative, Rtu4jwh 2+ but CISH negative along with DCIS 0.5 cm; sentinel lymph nodes were negative.  [de-identified] : invasive ductal carcinoma ER negative, [facility] negative, Fkc1nyz CISH negative  [de-identified] : Bautista done 2019: VUS MILAN p.[id number]; no actionable mutations  [de-identified] : She had blood work with Dr Buenrostro on her physical. Denies any new breast pain or chest wall changes. No back pain, cough or NICOLE. Saw Dr Morse in August. \par  [Date: ____________] : Patient's last distress assessment performed on [unfilled]. [1 - Distress Level] : Distress Level: 1 [Patient given social work contact information and resource sheet] : Patient was given social work contact information and resource sheet

## 2023-07-31 ENCOUNTER — APPOINTMENT (OUTPATIENT)
Dept: GASTROENTEROLOGY | Facility: CLINIC | Age: 58
End: 2023-07-31
Payer: COMMERCIAL

## 2023-07-31 VITALS
DIASTOLIC BLOOD PRESSURE: 69 MMHG | WEIGHT: 170 LBS | TEMPERATURE: 98.2 F | SYSTOLIC BLOOD PRESSURE: 134 MMHG | BODY MASS INDEX: 30.12 KG/M2 | OXYGEN SATURATION: 98 % | HEART RATE: 72 BPM | HEIGHT: 63 IN

## 2023-07-31 DIAGNOSIS — K63.5 POLYP OF COLON: ICD-10-CM

## 2023-07-31 DIAGNOSIS — R19.5 OTHER FECAL ABNORMALITIES: ICD-10-CM

## 2023-07-31 DIAGNOSIS — Z09 ENCOUNTER FOR FOLLOW-UP EXAMINATION AFTER COMPLETED TREATMENT FOR CONDITIONS OTHER THAN MALIGNANT NEOPLASM: ICD-10-CM

## 2023-07-31 PROCEDURE — 99214 OFFICE O/P EST MOD 30 MIN: CPT

## 2023-07-31 NOTE — PHYSICAL EXAM

## 2023-07-31 NOTE — HISTORY OF PRESENT ILLNESS
[FreeTextEntry1] :  58 year old female being seen for Follow up post procedure. Colonoscopy preformed 2/2023 diverticulosis with x1 colon polyp as per report from Dr Foster patient will need f/u colon in 5 years  Patients of occasional loose stool when eating pork marino only. She states this happens usually 1-2 times per month when she consumes this marino. Bowel movements are usually daily and regular without difficulty or strain.   Denies rectal bleeding, blood in the stool, melena, or hematemesis.

## 2023-07-31 NOTE — ASSESSMENT
[FreeTextEntry1] : Attending Attestation   The patient mentioned feeling BLOATED   A low FODMAP diet was discussed with the patient at length. The patient had multiple questions all of which were answered. I recommended a nutritionist. Also recommended that the patient keep a food diary. We discussed  options such as Vegetables. Fresh fruits. Dairy that is lactose-free, and hard cheeses, or ripened/matured cheeses including... Beef, pork, chicken, fish, eggs. Avoid breadcrumbs, marinades, and sauces/gravies that may be high in FODMAPs. Soy products including tofu, tempeh. Grains.  The Patient mentioned being GASEOUS  We discussed Probiotics and limiting leafy vegetables and other changes   The above medical issues were discussed in detail and all questions answered over a 35 minute period.

## 2023-11-27 ENCOUNTER — APPOINTMENT (OUTPATIENT)
Dept: PULMONOLOGY | Facility: CLINIC | Age: 58
End: 2023-11-27

## 2024-02-22 ENCOUNTER — APPOINTMENT (OUTPATIENT)
Dept: MRI IMAGING | Facility: IMAGING CENTER | Age: 59
End: 2024-02-22
Payer: COMMERCIAL

## 2024-02-22 ENCOUNTER — OUTPATIENT (OUTPATIENT)
Dept: OUTPATIENT SERVICES | Facility: HOSPITAL | Age: 59
LOS: 1 days | End: 2024-02-22
Payer: COMMERCIAL

## 2024-02-22 DIAGNOSIS — Z00.8 ENCOUNTER FOR OTHER GENERAL EXAMINATION: ICD-10-CM

## 2024-02-22 DIAGNOSIS — Z90.13 ACQUIRED ABSENCE OF BILATERAL BREASTS AND NIPPLES: Chronic | ICD-10-CM

## 2024-02-22 DIAGNOSIS — Z98.51 TUBAL LIGATION STATUS: Chronic | ICD-10-CM

## 2024-02-22 DIAGNOSIS — Z98.891 HISTORY OF UTERINE SCAR FROM PREVIOUS SURGERY: Chronic | ICD-10-CM

## 2024-02-22 PROCEDURE — C8908: CPT

## 2024-02-22 PROCEDURE — C8937: CPT

## 2024-02-22 PROCEDURE — A9585: CPT

## 2024-02-22 PROCEDURE — 77049 MRI BREAST C-+ W/CAD BI: CPT | Mod: 26

## 2024-03-12 ENCOUNTER — OUTPATIENT (OUTPATIENT)
Dept: OUTPATIENT SERVICES | Facility: HOSPITAL | Age: 59
LOS: 1 days | Discharge: HOME | End: 2024-03-12

## 2024-03-12 DIAGNOSIS — Z98.51 TUBAL LIGATION STATUS: Chronic | ICD-10-CM

## 2024-03-12 DIAGNOSIS — Z90.13 ACQUIRED ABSENCE OF BILATERAL BREASTS AND NIPPLES: Chronic | ICD-10-CM

## 2024-03-12 DIAGNOSIS — C50.919 MALIGNANT NEOPLASM OF UNSPECIFIED SITE OF UNSPECIFIED FEMALE BREAST: ICD-10-CM

## 2024-03-12 DIAGNOSIS — Z98.891 HISTORY OF UTERINE SCAR FROM PREVIOUS SURGERY: Chronic | ICD-10-CM

## 2024-04-08 ENCOUNTER — APPOINTMENT (OUTPATIENT)
Dept: HEMATOLOGY ONCOLOGY | Facility: CLINIC | Age: 59
End: 2024-04-08
Payer: COMMERCIAL

## 2024-04-08 PROCEDURE — 99214 OFFICE O/P EST MOD 30 MIN: CPT

## 2024-04-14 NOTE — CONSULT LETTER
[Dear  ___] : Dear  [unfilled], [Courtesy Letter:] : I had the pleasure of seeing your patient, [unfilled], in my office today. [Please see my note below.] : Please see my note below. [Consult Closing:] : Thank you very much for allowing me to participate in the care of this patient.  If you have any questions, please do not hesitate to contact me. [Sincerely,] : Sincerely, [FreeTextEntry2] : Raphael Mendez MD [address] [place] [address] [FreeTextEntry3] : Javed Bernal MD Attending [facility]

## 2024-04-14 NOTE — HISTORY OF PRESENT ILLNESS
[Disease: _____________________] : Disease: [unfilled] [T: ___] : T[unfilled] [N: ___] : N[unfilled] [AJCC Stage: ____] : AJCC Stage: [unfilled] [de-identified] : Age 54: left breast cancer \par  Screening mammogram was negative and sonogram showed area of distortion and posterior acoustical shadowing in the 2:00 axis. She had left breast u/s guided biopsy which showed invasive ductal carcinoma SBR 5/9, ER negative, DE negative, [id number] 3+ with Ki67 5% along with extensive DCIS. FISH was performed on the biopsy specimen which was negative. She had MRI of the breast on 7/31/19 which showed 2 cm linear area of focal nonmass clumped enhancement and L breast 3.1 x 1.5 cm focal nonmass enhancement and 0.6 cm circumscribed mass in the outer anterior left breast. On 8/19/19, she underwent bilateral mastectomy and sentinel lymph node biopsy. The pathology showed 0.4 cm ER negative, DE negative, Rnj1bjo 2+ but CISH negative along with DCIS 0.5 cm; sentinel lymph nodes were negative.  [de-identified] : Bautista done 2019: VUS MILAN [id number]; no actionable mutations  [de-identified] : invasive ductal carcinoma ER negative, GA negative, Woh7ced CISH negative  [de-identified] : Denies any new breast pain or chest wall changes. No back pain, cough or HA. She denies any new health changes or medications.

## 2024-04-14 NOTE — ASSESSMENT
[FreeTextEntry1] : 59 y/o BRCA negative  F with Stage IB left breast cancer s/p bilateral mastectomy with sentinel lymph node biopsy in 2019. We reviewed signs and symptoms of breast cancer recurrence. No new signs or symptoms of recurrence. We reviewed low fat diet and exercise daily to help improve breast cancer survival as per WINS study. Reviewed that after 5 years; risk of breast cancer recurrence TNBC very low. Reviewed continued surveillance. Questions answered to her satisfaction. She is agreeable with plan. Next follow up in 1 year but earlier if any new symptoms.

## 2024-04-14 NOTE — PHYSICAL EXAM
[Fully active, able to carry on all pre-disease performance without restriction] : Status 0 - Fully active, able to carry on all pre-disease performance without restriction [Normal] : affect appropriate [de-identified] : bilateral mastectomy with ESA flap reconstruction; area of fat necrosis 9:00 L reconstruction; no palpable axillary LN  [de-identified] : ESA flap scar

## 2024-04-22 ENCOUNTER — APPOINTMENT (OUTPATIENT)
Dept: PULMONOLOGY | Facility: CLINIC | Age: 59
End: 2024-04-22
Payer: COMMERCIAL

## 2024-04-22 VITALS
HEART RATE: 73 BPM | DIASTOLIC BLOOD PRESSURE: 84 MMHG | WEIGHT: 164 LBS | SYSTOLIC BLOOD PRESSURE: 142 MMHG | OXYGEN SATURATION: 99 % | HEIGHT: 63 IN | TEMPERATURE: 98.1 F | BODY MASS INDEX: 29.06 KG/M2

## 2024-04-22 DIAGNOSIS — C50.919 MALIGNANT NEOPLASM OF UNSPECIFIED SITE OF UNSPECIFIED FEMALE BREAST: ICD-10-CM

## 2024-04-22 PROCEDURE — 99204 OFFICE O/P NEW MOD 45 MIN: CPT | Mod: 25

## 2024-04-22 PROCEDURE — 36415 COLL VENOUS BLD VENIPUNCTURE: CPT

## 2024-04-22 PROCEDURE — 94729 DIFFUSING CAPACITY: CPT

## 2024-04-22 PROCEDURE — 94010 BREATHING CAPACITY TEST: CPT

## 2024-04-22 PROCEDURE — 94727 GAS DIL/WSHOT DETER LNG VOL: CPT

## 2024-04-22 PROCEDURE — ZZZZZ: CPT

## 2024-04-22 PROCEDURE — 71046 X-RAY EXAM CHEST 2 VIEWS: CPT

## 2024-04-22 NOTE — REASON FOR VISIT
[Consultation] : a consultation [Cough] : cough [Shortness of Breath] : shortness of breath [TextBox_13] : Dr. Holden Delaney

## 2024-04-22 NOTE — ASSESSMENT
[FreeTextEntry1] : Venipuncture with labs drawn in office.  Obtained and reviewed pulmonary function test, chest x-ray results with patient today.  Ordered CTPA to rule out PE and to evaluate her underlying lung parenchyma.  Return for pulmonary follow up in 1 month after receiving CTPA.

## 2024-04-22 NOTE — HISTORY OF PRESENT ILLNESS
[TextBox_4] : KHUSHBU ORSALES is a 58 year female, with history of left breast cancer, s/p bilateral mastectomies, who presents to the office for an initial pulmonary evaluation. Patient reports of having symptoms of dyspnea that started 8 months ago. She also states of having symptoms of intermittent cough. She denies of having any symptoms of wheezing. Patient reports that she is concerned about second hang smoking as her  who smokes cigarettes heavily in their household. She states of receiving a recent chest x-ray which was unremarkable. Patient denies of having any known allergy to contrast.

## 2024-04-22 NOTE — CONSULT LETTER
[Dear  ___] : Dear  [unfilled], [Courtesy Letter:] : I had the pleasure of seeing your patient, [unfilled], in my office today. [Please see my note below.] : Please see my note below. [Consult Closing:] : Thank you very much for allowing me to participate in the care of this patient.  If you have any questions, please do not hesitate to contact me. [Sincerely,] : Sincerely, [FreeTextEntry3] : Dr. Yesi Avalos

## 2024-04-22 NOTE — PROCEDURE
[FreeTextEntry1] : Pulmonary Function Test obtained in office today which revealed: Spirometry: Within normal limits, Lung Volume: Within normal limits, Diffusion: Within normal limits.  ___  Xray Chest 2 Views PA/Lat             Final  No Documents Attached    	 Chest x-ray PA and lateral views performed in my office today showed s/p surgical clips on bilateral anterior chest walls secondary to history of bilateral mastectomies, clear lungs, no evidence of infiltrates or pleural effusions.  Ordered by: ELENI ROLLINS       Collected/Examined: 22Apr2024 11:47AM       Verification Required       Stage: Final       Performed at: In Office       Performed by: ELENI ROLLINS       Resulted: 22Apr2024 11:47AM       Last Updated: 22Apr2024 11:48AM

## 2024-04-22 NOTE — DISCUSSION/SUMMARY
[FreeTextEntry1] : Ms. KHUSHBU ROSALES is an 58 year old female, history of breast cancer. Dyspnea of unclear etiology, rule out pulmonary embolism, rule out COPD/lung nodules in this secondhand smoker.

## 2024-04-22 NOTE — ADDENDUM
[FreeTextEntry1] : I, Candy Chavezsisi, acted solely as a scribe for Dr. Yesi Avalos D.O., on this date 04/22/2024.   All medical record entries made by the Scribe were at my, Dr. Yesi Avalos D.O., direction and personally dictated by me on 04/22/2024. I have reviewed the chart and agree that the record accurately reflects my personal performance of the history, physical exam, assessment and plan. I have also personally directed, reviewed, and agreed with the chart.

## 2024-04-28 LAB
ANION GAP SERPL CALC-SCNC: 12 MMOL/L
BUN SERPL-MCNC: 15 MG/DL
CALCIUM SERPL-MCNC: 10 MG/DL
CHLORIDE SERPL-SCNC: 106 MMOL/L
CO2 SERPL-SCNC: 24 MMOL/L
CREAT SERPL-MCNC: 0.81 MG/DL
EGFR: 83 ML/MIN/1.73M2
GLUCOSE SERPL-MCNC: 90 MG/DL
HEMOGLOBIN: 13.5
POTASSIUM SERPL-SCNC: 4.4 MMOL/L
SODIUM SERPL-SCNC: 142 MMOL/L

## 2024-05-11 ENCOUNTER — NON-APPOINTMENT (OUTPATIENT)
Age: 59
End: 2024-05-11

## 2024-05-20 ENCOUNTER — APPOINTMENT (OUTPATIENT)
Dept: PULMONOLOGY | Facility: CLINIC | Age: 59
End: 2024-05-20
Payer: COMMERCIAL

## 2024-05-20 VITALS
WEIGHT: 164 LBS | OXYGEN SATURATION: 99 % | SYSTOLIC BLOOD PRESSURE: 130 MMHG | TEMPERATURE: 97.3 F | BODY MASS INDEX: 29.06 KG/M2 | HEART RATE: 108 BPM | HEIGHT: 63 IN | DIASTOLIC BLOOD PRESSURE: 84 MMHG

## 2024-05-20 DIAGNOSIS — R06.09 OTHER FORMS OF DYSPNEA: ICD-10-CM

## 2024-05-20 DIAGNOSIS — J45.909 UNSPECIFIED ASTHMA, UNCOMPLICATED: ICD-10-CM

## 2024-05-20 DIAGNOSIS — R05.9 COUGH, UNSPECIFIED: ICD-10-CM

## 2024-05-20 DIAGNOSIS — E78.00 PURE HYPERCHOLESTEROLEMIA, UNSPECIFIED: ICD-10-CM

## 2024-05-20 PROCEDURE — 99214 OFFICE O/P EST MOD 30 MIN: CPT

## 2024-05-20 RX ORDER — ROSUVASTATIN CALCIUM 20 MG/1
20 TABLET, FILM COATED ORAL
Qty: 90 | Refills: 3 | Status: ACTIVE | COMMUNITY
Start: 1900-01-01 | End: 1900-01-01

## 2024-05-20 RX ORDER — FLUTICASONE PROPIONATE AND SALMETEROL XINAFOATE 230; 21 UG/1; UG/1
230-21 AEROSOL, METERED RESPIRATORY (INHALATION)
Qty: 1 | Refills: 3 | Status: ACTIVE | COMMUNITY
Start: 2024-05-20 | End: 1900-01-01

## 2024-05-20 NOTE — ASSESSMENT
[FreeTextEntry1] : Discussed with Ana regarding aforementioned CTPA result in the office today. Start Advair 230/21 mcg HFA, 2 puffs twice daily at this point. Return for pulmonary follow up in 1 month. Continue rosuvastatin 20 mg p.o. daily for hypercholesterolemia.

## 2024-05-20 NOTE — CONSULT LETTER
[Dear  ___] : Dear  [unfilled], [Courtesy Letter:] : I had the pleasure of seeing your patient, [unfilled], in my office today. [Please see my note below.] : Please see my note below. [Consult Closing:] : Thank you very much for allowing me to participate in the care of this patient.  If you have any questions, please do not hesitate to contact me. [Sincerely,] : Sincerely, [FreeTextEntry3] : Dr. Yesi Avalos [DrCarlo  ___] : Dr. SHAW

## 2024-05-20 NOTE — DISCUSSION/SUMMARY
[FreeTextEntry1] : Ms. KHUSHBU ROSALES is an 58 year old female, history of breast cancer. Dyspnea possibly secondary to mild asthma, as demonstrated on CTPA.  Hypercholesterolemia.

## 2024-07-15 ENCOUNTER — APPOINTMENT (OUTPATIENT)
Dept: PULMONOLOGY | Facility: CLINIC | Age: 59
End: 2024-07-15
Payer: COMMERCIAL

## 2024-07-15 VITALS
DIASTOLIC BLOOD PRESSURE: 84 MMHG | WEIGHT: 164 LBS | BODY MASS INDEX: 29.06 KG/M2 | HEART RATE: 78 BPM | TEMPERATURE: 98.1 F | HEIGHT: 63 IN | SYSTOLIC BLOOD PRESSURE: 126 MMHG | OXYGEN SATURATION: 99 %

## 2024-07-15 DIAGNOSIS — R06.09 OTHER FORMS OF DYSPNEA: ICD-10-CM

## 2024-07-15 DIAGNOSIS — J45.909 UNSPECIFIED ASTHMA, UNCOMPLICATED: ICD-10-CM

## 2024-07-15 PROCEDURE — 99213 OFFICE O/P EST LOW 20 MIN: CPT | Mod: 25

## 2024-07-15 PROCEDURE — 94010 BREATHING CAPACITY TEST: CPT

## 2024-07-15 PROCEDURE — 95012 NITRIC OXIDE EXP GAS DETER: CPT

## 2024-07-15 RX ORDER — PREDNISONE 10 MG/1
10 TABLET ORAL
Qty: 18 | Refills: 0 | Status: ACTIVE | COMMUNITY
Start: 2024-07-15 | End: 1900-01-01

## 2024-07-22 ENCOUNTER — NON-APPOINTMENT (OUTPATIENT)
Age: 59
End: 2024-07-22

## 2024-07-23 ENCOUNTER — APPOINTMENT (OUTPATIENT)
Dept: NEUROLOGY | Facility: CLINIC | Age: 59
End: 2024-07-23

## 2024-08-05 ENCOUNTER — APPOINTMENT (OUTPATIENT)
Dept: PULMONOLOGY | Facility: CLINIC | Age: 59
End: 2024-08-05

## 2024-08-05 PROCEDURE — 99214 OFFICE O/P EST MOD 30 MIN: CPT

## 2024-08-05 NOTE — PROCEDURE
[FreeTextEntry1] : Spirometry is within normal limits. ________  Exhaled Nitric Oxide             Final  No Documents Attached    	Test  	Result  	Flag	Reference	Goal	Last Verified  	Exhaled Nitric Oxide	24	 	 		REQUIRED  Ordered by: ELENI ROLLINS       Collected/Examined: 15Bit1997 10:49AM       Verification Required       Stage: Final       Performed at: In Office       Performed by: ELENI ROLLINS       Resulted: [id number] 10:49AM       Last Updated: [id number] 10:49AM

## 2024-08-05 NOTE — CONSULT LETTER
[Dear  ___] : Dear  [unfilled], [Courtesy Letter:] : I had the pleasure of seeing your patient, [unfilled], in my office today. [Please see my note below.] : Please see my note below. [Consult Closing:] : Thank you very much for allowing me to participate in the care of this patient.  If you have any questions, please do not hesitate to contact me. [Sincerely,] : Sincerely, [DrCarlo  ___] : Dr. SHAW [FreeTextEntry3] : Dr. Yesi Avalos

## 2024-08-05 NOTE — ASSESSMENT
[FreeTextEntry1] : Discussed with Ana regarding aforementioned CTPA results in the office today. Continue Advair HFA for history of asthma Return for pulmonary follow up in 2 months. Continue rosuvastatin 20 mg p.o. daily for hypercholesterolemia.

## 2024-08-05 NOTE — PROCEDURE
[FreeTextEntry1] : Spirometry is within normal limits. ________  Exhaled Nitric Oxide             Final  No Documents Attached    	Test  	Result  	Flag	Reference	Goal	Last Verified  	Exhaled Nitric Oxide	24	 	 		REQUIRED  Ordered by: ELENI ROLLNIS       Collected/Examined: [id number] 10:49AM       Verification Required       Stage: Final       Performed at: In Office       Performed by: ELENI ROLLINS       Resulted: [id number] 10:49AM       Last Updated: [id number] 10:49AM

## 2024-08-19 ENCOUNTER — NON-APPOINTMENT (OUTPATIENT)
Age: 59
End: 2024-08-19

## 2024-08-20 ENCOUNTER — APPOINTMENT (OUTPATIENT)
Dept: NEUROLOGY | Facility: CLINIC | Age: 59
End: 2024-08-20
Payer: COMMERCIAL

## 2024-08-20 VITALS
HEIGHT: 63 IN | DIASTOLIC BLOOD PRESSURE: 80 MMHG | BODY MASS INDEX: 29.06 KG/M2 | HEART RATE: 68 BPM | WEIGHT: 164 LBS | SYSTOLIC BLOOD PRESSURE: 125 MMHG

## 2024-08-20 DIAGNOSIS — I65.29 OCCLUSION AND STENOSIS OF UNSPECIFIED CAROTID ARTERY: ICD-10-CM

## 2024-08-20 DIAGNOSIS — I77.3 ARTERIAL FIBROMUSCULAR DYSPLASIA: ICD-10-CM

## 2024-08-20 PROCEDURE — 99205 OFFICE O/P NEW HI 60 MIN: CPT

## 2024-08-20 RX ORDER — ASPIRIN 81 MG/1
81 TABLET, CHEWABLE ORAL
Refills: 0 | Status: ACTIVE | COMMUNITY

## 2024-08-20 NOTE — PHYSICAL EXAM
[General Appearance - Alert] : alert [General Appearance - Well Nourished] : well nourished [General Appearance - Well Developed] : well developed [Oriented To Time, Place, And Person] : oriented to person, place, and time [Affect] : the affect was normal [Mood] : the mood was normal [Person] : oriented to person [Place] : oriented to place [Time] : oriented to time [Concentration Intact] : normal concentrating ability [Visual Intact] : visual attention was ~T not ~L decreased [Naming Objects] : no difficulty naming common objects [Repeating Phrases] : no difficulty repeating a phrase [Writing A Sentence] : no difficulty writing a sentence [Fluency] : fluency intact [Comprehension] : comprehension intact [Reading] : reading intact [Past History] : adequate knowledge of personal past history [Cranial Nerves Optic (II)] : visual acuity intact bilaterally,  visual fields full to confrontation, pupils equal round and reactive to light [Cranial Nerves Oculomotor (III)] : extraocular motion intact [Cranial Nerves Trigeminal (V)] : facial sensation intact symmetrically [Cranial Nerves Facial (VII)] : face symmetrical [Cranial Nerves Glossopharyngeal (IX)] : tongue and palate midline [Cranial Nerves Vestibulocochlear (VIII)] : hearing was intact bilaterally [Cranial Nerves Accessory (XI - Cranial And Spinal)] : head turning and shoulder shrug symmetric [Cranial Nerves Hypoglossal (XII)] : there was no tongue deviation with protrusion [Motor Tone] : muscle tone was normal in all four extremities [Motor Strength] : muscle strength was normal in all four extremities [No Muscle Atrophy] : normal bulk in all four extremities [Sensation Tactile Decrease] : light touch was intact [Balance] : balance was intact [Full Visual Field] : full visual field [Hearing Threshold Finger Rub Not Pender] : hearing was normal [Heart Rate And Rhythm] : heart rate was normal and rhythm regular [] : no respiratory distress [Edema] : there was no peripheral edema [Abnormal Walk] : normal gait

## 2024-08-20 NOTE — REVIEW OF SYSTEMS
[Feeling Poorly] : not feeling poorly [Feeling Tired] : not feeling tired [Confused or Disoriented] : no confusion [Memory Lapses or Loss] : no memory loss [Decr. Concentrating Ability] : no decrease in concentrating ability [Difficulty with Language] : no ~M difficulty with language [Changed Thought Patterns] : no change in thought patterns [Repeating Questions] : no repeated questioning about recent events [Leg Weakness] : no leg weakness [Poor Coordination] : good coordination [Difficulty Writing] : no difficulty writing [Difficulties in Speech] : no speech difficulties [Anxiety] : no anxiety [Depression] : no depression [Eyesight Problems] : no eyesight problems [Loss Of Hearing] : no hearing loss [Chest Pain] : no chest pain [Palpitations] : no palpitations [Shortness Of Breath] : no shortness of breath [Wheezing] : no wheezing [Vomiting] : no vomiting [Easy Bleeding] : no tendency for easy bleeding [Easy Bruising] : no tendency for easy bruising

## 2024-08-20 NOTE — DISCUSSION/SUMMARY
[FreeTextEntry1] : Ana Weeks is a 59 year old woman with a PMHX of breast CA s/p double mastectomy, asthma, referred by Dr. Delaney for torturous blood vessels. We discussed imaging in detail. She has fibromuscular dysplasia in both carotid arteries. Plan for MRA Head as there is an association with FMD and cerebral aneurysms. I will see her again after imaging. All of her questions and concerns were addressed.

## 2024-08-20 NOTE — CONSULT LETTER
[Dear  ___] : Dear  [unfilled], [Consult Closing:] : Thank you very much for allowing me to participate in the care of this patient.  If you have any questions, please do not hesitate to contact me. [Consult Letter:] : I had the pleasure of evaluating your patient, [unfilled]. [Please see my note below.] : Please see my note below. [Sincerely,] : Sincerely, [FreeTextEntry3] : Otoniel López MD Chief, Vascular Neurology and Neurology Service , NeuroEndovascular Surgery Professor of Neurology and Radiology [facility] [employer] at [facility]/Health system Director, NeuroEndovascular Surgery [facility]

## 2024-08-20 NOTE — HISTORY OF PRESENT ILLNESS
[FreeTextEntry1] : Ana Weeks is a 59 year old woman with a PMHX of breast CA s/p double mastectomy, asthma, referred by Dr. Delaney for recent imaging. She had a carotid doppler which showed bilateral tortuosity and was sent for a CTA. CTA shows turtorous blood vessels, fibromuscular dysplasia. All neuroimaging reviewed personally by me.

## 2024-09-01 ENCOUNTER — OUTPATIENT (OUTPATIENT)
Dept: OUTPATIENT SERVICES | Facility: HOSPITAL | Age: 59
LOS: 1 days | End: 2024-09-01
Payer: COMMERCIAL

## 2024-09-01 DIAGNOSIS — Z98.51 TUBAL LIGATION STATUS: Chronic | ICD-10-CM

## 2024-09-01 DIAGNOSIS — I77.3 ARTERIAL FIBROMUSCULAR DYSPLASIA: ICD-10-CM

## 2024-09-01 DIAGNOSIS — Z90.13 ACQUIRED ABSENCE OF BILATERAL BREASTS AND NIPPLES: Chronic | ICD-10-CM

## 2024-09-01 DIAGNOSIS — Z98.891 HISTORY OF UTERINE SCAR FROM PREVIOUS SURGERY: Chronic | ICD-10-CM

## 2024-09-01 PROCEDURE — 70544 MR ANGIOGRAPHY HEAD W/O DYE: CPT

## 2024-09-04 DIAGNOSIS — I77.3 ARTERIAL FIBROMUSCULAR DYSPLASIA: ICD-10-CM

## 2024-09-12 ENCOUNTER — LABORATORY RESULT (OUTPATIENT)
Age: 59
End: 2024-09-12

## 2024-09-12 ENCOUNTER — APPOINTMENT (OUTPATIENT)
Dept: SURGERY | Facility: CLINIC | Age: 59
End: 2024-09-12
Payer: COMMERCIAL

## 2024-09-12 VITALS
WEIGHT: 170 LBS | HEART RATE: 71 BPM | SYSTOLIC BLOOD PRESSURE: 136 MMHG | BODY MASS INDEX: 29.02 KG/M2 | DIASTOLIC BLOOD PRESSURE: 73 MMHG | HEIGHT: 64 IN

## 2024-09-12 DIAGNOSIS — Z87.898 PERSONAL HISTORY OF OTHER SPECIFIED CONDITIONS: ICD-10-CM

## 2024-09-12 PROBLEM — E04.2 MULTIPLE THYROID NODULES: Status: ACTIVE | Noted: 2024-09-12

## 2024-09-12 PROCEDURE — 99204 OFFICE O/P NEW MOD 45 MIN: CPT | Mod: 25

## 2024-09-12 PROCEDURE — 10005 FNA BX W/US GDN 1ST LES: CPT

## 2024-09-12 NOTE — ASSESSMENT
[FreeTextEntry1] : due to concern from prior history of breast cancer, patient wishes to undergo biopsy of the largest nodule.  sonogram guided biopsy right 1.2 cm thyroid nodule performed. will observe remaining nodules. discussed that size of nodule is below the threshold for which fine needle aspiration cytology is generally recommended in the absence of any suspicious sonographic or clinical findings.  to call next week for results. patient has been given the opportunity to ask questions, and all of the patient's questions have been answered to their satisfaction

## 2024-09-12 NOTE — CONSULT LETTER
[Dear  ___] : Dear  [unfilled], [Consult Letter:] : I had the pleasure of evaluating your patient, [unfilled]. [Please see my note below.] : Please see my note below. [Consult Closing:] : Thank you very much for allowing me to participate in the care of this patient.  If you have any questions, please do not hesitate to contact me. [Sincerely,] : Sincerely, [FreeTextEntry2] : Dr. Andrea Delaney, Dr. Raphael Mendez [FreeTextEntry3] : Luke Westbrook MD, FACS System Director, Endocrine Surgery [facility] Associate  Professor of Surgery [facility] [employer] at [facility] [DrCarlo  ___] : Dr. SHAW

## 2024-09-12 NOTE — PHYSICAL EXAM
[de-identified] : 1 cm right thyroid nodule, well circumscribed and mobile [Laryngoscopy Performed] : laryngoscopy was performed, see procedure section for findings [Midline] : located in midline position [Normal] : orientation to person, place, and time: normal [de-identified] : indirect  laryngoscopy shows normal vocal cord mobility bilaterally with no lesions noted

## 2024-09-12 NOTE — HISTORY OF PRESENT ILLNESS
[de-identified] : Pt c/o thyroid nodules found on carotid doppler.  denies dysphagia, hoarseness, SOB or RT exposure sonogram: Right 1.2 cm TR-4 and bilateral subcentimeter nodules normal TFTs,  Ca 10 I have reviewed all old and new data and available images.

## 2024-09-16 DIAGNOSIS — E04.2 NONTOXIC MULTINODULAR GOITER: ICD-10-CM

## 2024-10-01 ENCOUNTER — APPOINTMENT (OUTPATIENT)
Dept: NEUROLOGY | Facility: CLINIC | Age: 59
End: 2024-10-01

## 2024-10-14 ENCOUNTER — APPOINTMENT (OUTPATIENT)
Dept: PULMONOLOGY | Facility: CLINIC | Age: 59
End: 2024-10-14

## 2025-02-12 ENCOUNTER — OUTPATIENT (OUTPATIENT)
Dept: OUTPATIENT SERVICES | Facility: HOSPITAL | Age: 60
LOS: 1 days | End: 2025-02-12
Payer: COMMERCIAL

## 2025-02-12 ENCOUNTER — APPOINTMENT (OUTPATIENT)
Dept: ULTRASOUND IMAGING | Facility: IMAGING CENTER | Age: 60
End: 2025-02-12
Payer: COMMERCIAL

## 2025-02-12 DIAGNOSIS — Z98.891 HISTORY OF UTERINE SCAR FROM PREVIOUS SURGERY: Chronic | ICD-10-CM

## 2025-02-12 DIAGNOSIS — E04.2 NONTOXIC MULTINODULAR GOITER: ICD-10-CM

## 2025-02-12 DIAGNOSIS — Z98.51 TUBAL LIGATION STATUS: Chronic | ICD-10-CM

## 2025-02-12 DIAGNOSIS — Z00.8 ENCOUNTER FOR OTHER GENERAL EXAMINATION: ICD-10-CM

## 2025-02-12 DIAGNOSIS — Z90.13 ACQUIRED ABSENCE OF BILATERAL BREASTS AND NIPPLES: Chronic | ICD-10-CM

## 2025-02-12 PROCEDURE — 76536 US EXAM OF HEAD AND NECK: CPT | Mod: 26

## 2025-02-12 PROCEDURE — 76536 US EXAM OF HEAD AND NECK: CPT

## 2025-02-20 ENCOUNTER — APPOINTMENT (OUTPATIENT)
Dept: SURGERY | Facility: CLINIC | Age: 60
End: 2025-02-20
Payer: COMMERCIAL

## 2025-02-20 DIAGNOSIS — E04.2 NONTOXIC MULTINODULAR GOITER: ICD-10-CM

## 2025-02-20 PROCEDURE — 99214 OFFICE O/P EST MOD 30 MIN: CPT | Mod: 25

## 2025-02-21 LAB
T3 SERPL-MCNC: 145 NG/DL
T4 FREE SERPL-MCNC: 1.2 NG/DL
THYROGLOB AB SERPL-ACNC: 18.4 IU/ML
THYROPEROXIDASE AB SERPL IA-ACNC: 16.5 IU/ML
TSH SERPL-ACNC: 2.07 UIU/ML

## 2025-04-28 NOTE — H&P PST ADULT - NS PRO FEM REPRO HEALTH SCREEN
Update History & Physical    The patient's History and Physical of April 23, 2025 was reviewed with the patient and I examined the patient. There was no change. The surgical site was confirmed by the patient and me.     Plan: The risks, benefits, expected outcome, and alternative to the recommended procedure have been discussed with the patient. Patient understands and wants to proceed with the procedure.     Electronically signed by Alberto Frederick MD on 4/28/2025 at 7:31 AM       06:00 breast self-exam/mammogram

## 2025-06-05 ENCOUNTER — APPOINTMENT (OUTPATIENT)
Dept: HEPATOLOGY | Facility: CLINIC | Age: 60
End: 2025-06-05
Payer: COMMERCIAL

## 2025-06-05 VITALS
BODY MASS INDEX: 28 KG/M2 | HEART RATE: 76 BPM | HEIGHT: 64 IN | TEMPERATURE: 98 F | OXYGEN SATURATION: 97 % | SYSTOLIC BLOOD PRESSURE: 115 MMHG | WEIGHT: 164 LBS | DIASTOLIC BLOOD PRESSURE: 70 MMHG

## 2025-06-05 DIAGNOSIS — R16.0 HEPATOMEGALY, NOT ELSEWHERE CLASSIFIED: ICD-10-CM

## 2025-06-05 DIAGNOSIS — K76.0 FATTY (CHANGE OF) LIVER, NOT ELSEWHERE CLASSIFIED: ICD-10-CM

## 2025-06-05 PROCEDURE — 99204 OFFICE O/P NEW MOD 45 MIN: CPT

## 2025-06-26 ENCOUNTER — OUTPATIENT (OUTPATIENT)
Dept: OUTPATIENT SERVICES | Facility: HOSPITAL | Age: 60
LOS: 1 days | Discharge: HOME | End: 2025-06-26

## 2025-06-26 DIAGNOSIS — Z90.13 ACQUIRED ABSENCE OF BILATERAL BREASTS AND NIPPLES: Chronic | ICD-10-CM

## 2025-06-26 DIAGNOSIS — Z98.51 TUBAL LIGATION STATUS: Chronic | ICD-10-CM

## 2025-06-26 DIAGNOSIS — C50.919 MALIGNANT NEOPLASM OF UNSPECIFIED SITE OF UNSPECIFIED FEMALE BREAST: ICD-10-CM

## 2025-06-26 DIAGNOSIS — Z98.891 HISTORY OF UTERINE SCAR FROM PREVIOUS SURGERY: Chronic | ICD-10-CM

## 2025-06-28 ENCOUNTER — NON-APPOINTMENT (OUTPATIENT)
Age: 60
End: 2025-06-28

## 2025-07-21 ENCOUNTER — APPOINTMENT (OUTPATIENT)
Dept: PULMONOLOGY | Facility: CLINIC | Age: 60
End: 2025-07-21
Payer: COMMERCIAL

## 2025-07-21 VITALS
HEART RATE: 76 BPM | WEIGHT: 169 LBS | DIASTOLIC BLOOD PRESSURE: 77 MMHG | HEIGHT: 64 IN | SYSTOLIC BLOOD PRESSURE: 119 MMHG | BODY MASS INDEX: 28.85 KG/M2 | OXYGEN SATURATION: 96 % | TEMPERATURE: 98 F

## 2025-07-21 DIAGNOSIS — R05.9 COUGH, UNSPECIFIED: ICD-10-CM

## 2025-07-21 DIAGNOSIS — J45.909 UNSPECIFIED ASTHMA, UNCOMPLICATED: ICD-10-CM

## 2025-07-21 DIAGNOSIS — R91.8 OTHER NONSPECIFIC ABNORMAL FINDING OF LUNG FIELD: ICD-10-CM

## 2025-07-21 LAB — HEMOGLOBIN: 13.3

## 2025-07-21 PROCEDURE — 94727 GAS DIL/WSHOT DETER LNG VOL: CPT

## 2025-07-21 PROCEDURE — 99214 OFFICE O/P EST MOD 30 MIN: CPT | Mod: 25

## 2025-07-21 PROCEDURE — 94729 DIFFUSING CAPACITY: CPT

## 2025-07-21 PROCEDURE — ZZZZZ: CPT

## 2025-07-21 PROCEDURE — 94010 BREATHING CAPACITY TEST: CPT

## 2025-07-21 PROCEDURE — 85018 HEMOGLOBIN: CPT | Mod: QW

## 2025-07-21 RX ORDER — FLUTICASONE PROPIONATE AND SALMETEROL 500; 50 UG/1; UG/1
500-50 POWDER RESPIRATORY (INHALATION)
Qty: 3 | Refills: 3 | Status: ACTIVE | COMMUNITY
Start: 2025-07-21 | End: 1900-01-01

## 2025-08-25 ENCOUNTER — APPOINTMENT (OUTPATIENT)
Dept: PULMONOLOGY | Facility: CLINIC | Age: 60
End: 2025-08-25
Payer: COMMERCIAL

## 2025-08-25 VITALS
SYSTOLIC BLOOD PRESSURE: 146 MMHG | TEMPERATURE: 98.2 F | DIASTOLIC BLOOD PRESSURE: 84 MMHG | HEART RATE: 83 BPM | HEIGHT: 64 IN | WEIGHT: 169 LBS | BODY MASS INDEX: 28.85 KG/M2 | OXYGEN SATURATION: 98 %

## 2025-08-25 DIAGNOSIS — R91.1 SOLITARY PULMONARY NODULE: ICD-10-CM

## 2025-08-25 DIAGNOSIS — R05.9 COUGH, UNSPECIFIED: ICD-10-CM

## 2025-08-25 DIAGNOSIS — R91.8 OTHER NONSPECIFIC ABNORMAL FINDING OF LUNG FIELD: ICD-10-CM

## 2025-08-25 DIAGNOSIS — J45.909 UNSPECIFIED ASTHMA, UNCOMPLICATED: ICD-10-CM

## 2025-08-25 DIAGNOSIS — R06.09 OTHER FORMS OF DYSPNEA: ICD-10-CM

## 2025-08-25 PROCEDURE — G2211 COMPLEX E/M VISIT ADD ON: CPT | Mod: NC

## 2025-08-25 PROCEDURE — 99214 OFFICE O/P EST MOD 30 MIN: CPT
